# Patient Record
Sex: MALE | URBAN - METROPOLITAN AREA
[De-identification: names, ages, dates, MRNs, and addresses within clinical notes are randomized per-mention and may not be internally consistent; named-entity substitution may affect disease eponyms.]

---

## 2017-07-31 ENCOUNTER — INPATIENT (INPATIENT)
Facility: HOSPITAL | Age: 67
LOS: 6 days | Discharge: HOME | End: 2017-08-07
Attending: INTERNAL MEDICINE | Admitting: INTERNAL MEDICINE
Payer: SELF-PAY

## 2017-07-31 VITALS
OXYGEN SATURATION: 99 % | HEIGHT: 66 IN | HEART RATE: 124 BPM | TEMPERATURE: 98 F | RESPIRATION RATE: 20 BRPM | WEIGHT: 199.96 LBS | DIASTOLIC BLOOD PRESSURE: 101 MMHG | SYSTOLIC BLOOD PRESSURE: 136 MMHG

## 2017-07-31 DIAGNOSIS — N17.9 ACUTE KIDNEY FAILURE, UNSPECIFIED: ICD-10-CM

## 2017-07-31 DIAGNOSIS — E87.0 HYPEROSMOLALITY AND HYPERNATREMIA: ICD-10-CM

## 2017-07-31 DIAGNOSIS — I77.1 STRICTURE OF ARTERY: ICD-10-CM

## 2017-07-31 DIAGNOSIS — C7A.090 MALIGNANT CARCINOID TUMOR OF THE BRONCHUS AND LUNG: ICD-10-CM

## 2017-07-31 DIAGNOSIS — R03.0 ELEVATED BLOOD-PRESSURE READING, WITHOUT DIAGNOSIS OF HYPERTENSION: ICD-10-CM

## 2017-07-31 DIAGNOSIS — R91.8 OTHER NONSPECIFIC ABNORMAL FINDING OF LUNG FIELD: ICD-10-CM

## 2017-07-31 DIAGNOSIS — E83.52 HYPERCALCEMIA: ICD-10-CM

## 2017-07-31 DIAGNOSIS — R73.9 HYPERGLYCEMIA, UNSPECIFIED: ICD-10-CM

## 2017-07-31 DIAGNOSIS — Z29.9 ENCOUNTER FOR PROPHYLACTIC MEASURES, UNSPECIFIED: ICD-10-CM

## 2017-07-31 DIAGNOSIS — E11.65 TYPE 2 DIABETES MELLITUS WITH HYPERGLYCEMIA: ICD-10-CM

## 2017-07-31 DIAGNOSIS — R79.89 OTHER SPECIFIED ABNORMAL FINDINGS OF BLOOD CHEMISTRY: ICD-10-CM

## 2017-07-31 DIAGNOSIS — R74.8 ABNORMAL LEVELS OF OTHER SERUM ENZYMES: ICD-10-CM

## 2017-07-31 DIAGNOSIS — I74.3 EMBOLISM AND THROMBOSIS OF ARTERIES OF THE LOWER EXTREMITIES: ICD-10-CM

## 2017-07-31 DIAGNOSIS — I10 ESSENTIAL (PRIMARY) HYPERTENSION: ICD-10-CM

## 2017-07-31 LAB
ALBUMIN SERPL ELPH-MCNC: 4.2 G/DL — SIGNIFICANT CHANGE UP (ref 3.3–5)
ALP SERPL-CCNC: 65 U/L — SIGNIFICANT CHANGE UP (ref 40–120)
ALT FLD-CCNC: 19 U/L — SIGNIFICANT CHANGE UP (ref 12–78)
ANION GAP SERPL CALC-SCNC: 17 MMOL/L — SIGNIFICANT CHANGE UP (ref 5–17)
APPEARANCE UR: CLEAR — SIGNIFICANT CHANGE UP
AST SERPL-CCNC: 7 U/L — LOW (ref 15–37)
BACTERIA # UR AUTO: ABNORMAL
BASOPHILS # BLD AUTO: 0.1 K/UL — SIGNIFICANT CHANGE UP (ref 0–0.2)
BASOPHILS NFR BLD AUTO: 0.9 % — SIGNIFICANT CHANGE UP (ref 0–2)
BILIRUB SERPL-MCNC: 0.4 MG/DL — SIGNIFICANT CHANGE UP (ref 0.2–1.2)
BILIRUB UR-MCNC: NEGATIVE — SIGNIFICANT CHANGE UP
BUN SERPL-MCNC: 24 MG/DL — HIGH (ref 7–23)
CALCIUM SERPL-MCNC: 11.5 MG/DL — HIGH (ref 8.5–10.1)
CHLORIDE SERPL-SCNC: 109 MMOL/L — HIGH (ref 96–108)
CK MB CFR SERPL CALC: 0.8 NG/ML — SIGNIFICANT CHANGE UP (ref 0.5–3.6)
CO2 SERPL-SCNC: 23 MMOL/L — SIGNIFICANT CHANGE UP (ref 22–31)
COLOR SPEC: YELLOW — SIGNIFICANT CHANGE UP
COMMENT - URINE: SIGNIFICANT CHANGE UP
CREAT SERPL-MCNC: 1.71 MG/DL — HIGH (ref 0.5–1.3)
DIFF PNL FLD: ABNORMAL
EOSINOPHIL # BLD AUTO: 0 K/UL — SIGNIFICANT CHANGE UP (ref 0–0.5)
EOSINOPHIL NFR BLD AUTO: 0 % — SIGNIFICANT CHANGE UP (ref 0–6)
GLUCOSE SERPL-MCNC: 615 MG/DL — CRITICAL HIGH (ref 70–99)
GLUCOSE UR QL: 1000 MG/DL
HCT VFR BLD CALC: 56.3 % — HIGH (ref 39–50)
HGB BLD-MCNC: 17.7 G/DL — HIGH (ref 13–17)
KETONES UR-MCNC: ABNORMAL
LEUKOCYTE ESTERASE UR-ACNC: ABNORMAL
LIDOCAIN IGE QN: 765 U/L — HIGH (ref 73–393)
LYMPHOCYTES # BLD AUTO: 15.6 % — SIGNIFICANT CHANGE UP (ref 13–44)
LYMPHOCYTES # BLD AUTO: 2 K/UL — SIGNIFICANT CHANGE UP (ref 1–3.3)
MAGNESIUM SERPL-MCNC: 3.4 MG/DL — HIGH (ref 1.6–2.6)
MCHC RBC-ENTMCNC: 29.8 PG — SIGNIFICANT CHANGE UP (ref 27–34)
MCHC RBC-ENTMCNC: 31.4 GM/DL — LOW (ref 32–36)
MCV RBC AUTO: 94.8 FL — SIGNIFICANT CHANGE UP (ref 80–100)
MONOCYTES # BLD AUTO: 0.8 K/UL — SIGNIFICANT CHANGE UP (ref 0–0.9)
MONOCYTES NFR BLD AUTO: 6.3 % — SIGNIFICANT CHANGE UP (ref 2–14)
NEUTROPHILS # BLD AUTO: 9.9 K/UL — HIGH (ref 1.8–7.4)
NEUTROPHILS NFR BLD AUTO: 77.1 % — HIGH (ref 43–77)
NITRITE UR-MCNC: NEGATIVE — SIGNIFICANT CHANGE UP
NT-PROBNP SERPL-SCNC: 46 PG/ML — SIGNIFICANT CHANGE UP (ref 0–125)
PH UR: 5 — SIGNIFICANT CHANGE UP (ref 5–8)
PLATELET # BLD AUTO: 281 K/UL — SIGNIFICANT CHANGE UP (ref 150–400)
POTASSIUM SERPL-MCNC: 4.5 MMOL/L — SIGNIFICANT CHANGE UP (ref 3.5–5.3)
POTASSIUM SERPL-SCNC: 4.5 MMOL/L — SIGNIFICANT CHANGE UP (ref 3.5–5.3)
PROT SERPL-MCNC: 9.5 GM/DL — HIGH (ref 6–8.3)
PROT UR-MCNC: 30 MG/DL
RBC # BLD: 5.94 M/UL — HIGH (ref 4.2–5.8)
RBC # FLD: 11.9 % — SIGNIFICANT CHANGE UP (ref 11–15)
RBC CASTS # UR COMP ASSIST: SIGNIFICANT CHANGE UP /HPF (ref 0–4)
SODIUM SERPL-SCNC: 149 MMOL/L — HIGH (ref 135–145)
SP GR SPEC: 1.01 — SIGNIFICANT CHANGE UP (ref 1.01–1.02)
TROPONIN I SERPL-MCNC: <.015 NG/ML — SIGNIFICANT CHANGE UP (ref 0.01–0.04)
UROBILINOGEN FLD QL: NEGATIVE MG/DL — SIGNIFICANT CHANGE UP
WBC # BLD: 12.8 K/UL — HIGH (ref 3.8–10.5)
WBC # FLD AUTO: 12.8 K/UL — HIGH (ref 3.8–10.5)
WBC UR QL: SIGNIFICANT CHANGE UP

## 2017-07-31 PROCEDURE — 99285 EMERGENCY DEPT VISIT HI MDM: CPT | Mod: 25

## 2017-07-31 PROCEDURE — 76700 US EXAM ABDOM COMPLETE: CPT | Mod: 26

## 2017-07-31 PROCEDURE — 93010 ELECTROCARDIOGRAM REPORT: CPT

## 2017-07-31 PROCEDURE — 99223 1ST HOSP IP/OBS HIGH 75: CPT

## 2017-07-31 PROCEDURE — 99053 MED SERV 10PM-8AM 24 HR FAC: CPT

## 2017-07-31 PROCEDURE — 71010: CPT | Mod: 26

## 2017-07-31 RX ORDER — INSULIN HUMAN 100 [IU]/ML
10 INJECTION, SOLUTION SUBCUTANEOUS ONCE
Qty: 0 | Refills: 0 | Status: COMPLETED | OUTPATIENT
Start: 2017-07-31 | End: 2017-07-31

## 2017-07-31 RX ORDER — DEXTROSE 50 % IN WATER 50 %
1 SYRINGE (ML) INTRAVENOUS ONCE
Qty: 0 | Refills: 0 | Status: DISCONTINUED | OUTPATIENT
Start: 2017-07-31 | End: 2017-08-04

## 2017-07-31 RX ORDER — AMLODIPINE BESYLATE 2.5 MG/1
5 TABLET ORAL ONCE
Qty: 0 | Refills: 0 | Status: COMPLETED | OUTPATIENT
Start: 2017-07-31 | End: 2017-07-31

## 2017-07-31 RX ORDER — INSULIN GLARGINE 100 [IU]/ML
10 INJECTION, SOLUTION SUBCUTANEOUS AT BEDTIME
Qty: 0 | Refills: 0 | Status: DISCONTINUED | OUTPATIENT
Start: 2017-07-31 | End: 2017-08-03

## 2017-07-31 RX ORDER — SODIUM CHLORIDE 9 MG/ML
1000 INJECTION, SOLUTION INTRAVENOUS
Qty: 0 | Refills: 0 | Status: DISCONTINUED | OUTPATIENT
Start: 2017-07-31 | End: 2017-08-04

## 2017-07-31 RX ORDER — DEXTROSE 50 % IN WATER 50 %
25 SYRINGE (ML) INTRAVENOUS ONCE
Qty: 0 | Refills: 0 | Status: DISCONTINUED | OUTPATIENT
Start: 2017-07-31 | End: 2017-08-04

## 2017-07-31 RX ORDER — AMLODIPINE BESYLATE 2.5 MG/1
5 TABLET ORAL DAILY
Qty: 0 | Refills: 0 | Status: DISCONTINUED | OUTPATIENT
Start: 2017-07-31 | End: 2017-08-02

## 2017-07-31 RX ORDER — INSULIN LISPRO 100/ML
VIAL (ML) SUBCUTANEOUS
Qty: 0 | Refills: 0 | Status: DISCONTINUED | OUTPATIENT
Start: 2017-07-31 | End: 2017-08-04

## 2017-07-31 RX ORDER — SODIUM CHLORIDE 9 MG/ML
1000 INJECTION INTRAMUSCULAR; INTRAVENOUS; SUBCUTANEOUS ONCE
Qty: 0 | Refills: 0 | Status: COMPLETED | OUTPATIENT
Start: 2017-07-31 | End: 2017-08-01

## 2017-07-31 RX ORDER — SODIUM CHLORIDE 9 MG/ML
1000 INJECTION INTRAMUSCULAR; INTRAVENOUS; SUBCUTANEOUS ONCE
Qty: 0 | Refills: 0 | Status: COMPLETED | OUTPATIENT
Start: 2017-07-31 | End: 2017-07-31

## 2017-07-31 RX ORDER — SODIUM CHLORIDE 9 MG/ML
1000 INJECTION, SOLUTION INTRAVENOUS
Qty: 0 | Refills: 0 | Status: DISCONTINUED | OUTPATIENT
Start: 2017-07-31 | End: 2017-08-01

## 2017-07-31 RX ORDER — DEXTROSE 50 % IN WATER 50 %
12.5 SYRINGE (ML) INTRAVENOUS ONCE
Qty: 0 | Refills: 0 | Status: DISCONTINUED | OUTPATIENT
Start: 2017-07-31 | End: 2017-08-04

## 2017-07-31 RX ORDER — INSULIN LISPRO 100/ML
VIAL (ML) SUBCUTANEOUS AT BEDTIME
Qty: 0 | Refills: 0 | Status: DISCONTINUED | OUTPATIENT
Start: 2017-07-31 | End: 2017-08-04

## 2017-07-31 RX ORDER — HEPARIN SODIUM 5000 [USP'U]/ML
5000 INJECTION INTRAVENOUS; SUBCUTANEOUS EVERY 12 HOURS
Qty: 0 | Refills: 0 | Status: DISCONTINUED | OUTPATIENT
Start: 2017-07-31 | End: 2017-08-03

## 2017-07-31 RX ORDER — INSULIN HUMAN 100 [IU]/ML
5 INJECTION, SOLUTION SUBCUTANEOUS
Qty: 100 | Refills: 0 | Status: DISCONTINUED | OUTPATIENT
Start: 2017-07-31 | End: 2017-07-31

## 2017-07-31 RX ORDER — GLUCAGON INJECTION, SOLUTION 0.5 MG/.1ML
1 INJECTION, SOLUTION SUBCUTANEOUS ONCE
Qty: 0 | Refills: 0 | Status: DISCONTINUED | OUTPATIENT
Start: 2017-07-31 | End: 2017-08-04

## 2017-07-31 RX ADMIN — Medication 4: at 23:27

## 2017-07-31 RX ADMIN — SODIUM CHLORIDE 150 MILLILITER(S): 9 INJECTION, SOLUTION INTRAVENOUS at 17:54

## 2017-07-31 RX ADMIN — SODIUM CHLORIDE 1000 MILLILITER(S): 9 INJECTION INTRAMUSCULAR; INTRAVENOUS; SUBCUTANEOUS at 10:47

## 2017-07-31 RX ADMIN — HEPARIN SODIUM 5000 UNIT(S): 5000 INJECTION INTRAVENOUS; SUBCUTANEOUS at 18:10

## 2017-07-31 RX ADMIN — SODIUM CHLORIDE 1000 MILLILITER(S): 9 INJECTION INTRAMUSCULAR; INTRAVENOUS; SUBCUTANEOUS at 08:25

## 2017-07-31 RX ADMIN — AMLODIPINE BESYLATE 5 MILLIGRAM(S): 2.5 TABLET ORAL at 23:27

## 2017-07-31 RX ADMIN — INSULIN HUMAN 10 UNIT(S): 100 INJECTION, SOLUTION SUBCUTANEOUS at 10:22

## 2017-07-31 RX ADMIN — Medication 8: at 17:52

## 2017-07-31 RX ADMIN — AMLODIPINE BESYLATE 5 MILLIGRAM(S): 2.5 TABLET ORAL at 18:10

## 2017-07-31 NOTE — ED PROVIDER NOTE - OBJECTIVE STATEMENT
Pt is a 68 yo gentleman with no significant past medical history who presents to the ED with diminished appetite since last week. Denies any abdominal pain, no n/v/d or fever. No headache, no dysuria. Came from [place] 1 month ago, says has had problem before in the past, and was given medication to improve his appetite. Says he has weight loss, but no night sweats. Does not know how much weight he has loss. He is able to eat, just doesn't want to eat as much. Has been drinking water and juice. No chest pain, no sob.

## 2017-07-31 NOTE — ED ADULT NURSE NOTE - OBJECTIVE STATEMENT
pt c/o poor appetite since x 2 weeks pt c/o poor appetite since x 2 weeks. pt states does not have pharmacy

## 2017-07-31 NOTE — H&P ADULT - PROBLEM SELECTOR PLAN 2
US done kidney no hydronephrosis   maybe be due to pre renal vs DM  continue with IVF if no improvement in 24 hours then consider renal consult and w/u

## 2017-07-31 NOTE — ED PROVIDER NOTE - MEDICAL DECISION MAKING DETAILS
Ddx: Dehydration / No focal causes or symptoms of malignancy/ diminished appetite from new cuisine, age  Plan: Labs, fluids, cxr, reassess

## 2017-07-31 NOTE — ED ADULT TRIAGE NOTE - CHIEF COMPLAINT QUOTE
"no appetite" reports having poor appetite for 2 weeks ago. Denies nausea, vomiting, diarrhea, abdominal discomfort. Reports have taken "Omafed" in the past for poor appetite with good improvement of appetite. Denies past medical history.

## 2017-07-31 NOTE — H&P ADULT - NSHPLABSRESULTS_GEN_ALL_CORE
17.7   12.8  )-----------( 281      ( 31 Jul 2017 08:25 )             56.3   07-31    149<H>  |  109<H>  |  24<H>  ----------------------------<  615<HH>  4.5   |  23  |  1.71<H>    Ca    11.5<H>      31 Jul 2017 08:25  Mg     3.4     07-31    TPro  9.5<H>  /  Alb  4.2  /  TBili  0.4  /  DBili  x   /  AST  7<L>  /  ALT  19  /  AlkPhos  65  07-31      < from: US Abdomen Complete (07.31.17 @ 11:40) >    IMPRESSION:    Hepatic fibrofatty changes.  No cholelithiasis or biliary ductal dilatation.    < end of copied text >< from: Xray Chest 1 View AP/PA. (07.31.17 @ 07:49) >    EXAM:  CHEST SINGLE VIEW                            PROCEDURE DATE:  07/31/2017          INTERPRETATION:  cough    A frontal chest film demonstrates a rounded lesion that suggesting a mass   in the left the periaortic region likely in the lower lobe. This measures   4.7 cm.    The heart size and vascular markings are within normal limits for   technique.      No mediastinal or hilar adenopathy is suggested. .     The osseous structures appear intact intact.     IMPRESSION:  Left lung mass. CT imaging of the chest recommended.

## 2017-07-31 NOTE — H&P ADULT - PROBLEM SELECTOR PLAN 6
could be due to dehydration or related to lung mass.    may need further w/u with PTH related peptide  especially if lung mass confirmed . will continue with IVF monitor calcium   will check ionized calcium

## 2017-07-31 NOTE — ED PROVIDER NOTE - PROGRESS NOTE DETAILS
Pt comfortable, without complaint. FS dropped to 475 after fluids, then 415 after insulin. Admitted for further management.

## 2017-07-31 NOTE — H&P ADULT - NSHPREVIEWOFSYSTEMS_GEN_ALL_CORE
CONSTITUTIONAL: No fever, weight loss, or fatigue  EYES: No eye pain, visual disturbances, or discharge  ENMT:  No difficulty hearing, tinnitus, vertigo; No sinus or throat pain  NECK: No pain or stiffness  BREASTS: No pain, masses, or nipple discharge  RESPIRATORY: No cough, wheezing, chills or hemoptysis; No shortness of breath  CARDIOVASCULAR: No chest pain, palpitations, dizziness, or leg swelling +weight loss, decreased appetite, frequent urination, blurred vission  GASTROINTESTINAL: No abdominal or epigastric pain. No nausea, vomiting, or hematemesis; No diarrhea or constipation. No melena or hematochezia.  GENITOURINARY: No dysuria,  increased frequency, no  hematuria, or incontinence  NEUROLOGICAL: No headaches, memory loss, loss of strength, numbness, or tremors  SKIN: No itching, burning, rashes, or lesions   LYMPH NODES: No enlarged glands  ENDOCRINE: No heat or cold intolerance; No hair loss  MUSCULOSKELETAL: No joint pain or swelling; No muscle, back, or extremity pain  PSYCHIATRIC: No depression, anxiety, mood swings, or difficulty sleeping  HEME/LYMPH: No easy bruising, or bleeding gums  ALLERGY AND IMMUNOLOGIC: No hives or eczema

## 2017-07-31 NOTE — H&P ADULT - PROBLEM SELECTOR PLAN 1
new onset DM. not in DKA as AG at 17 per [facility] labs.    s/p 10 units ed BS on admission 615 now 357.     check HgBA1C   Lipid panel  RISS then can convert to basal and short acting after 24 hours of FS    Dm education   Nutrition education

## 2017-07-31 NOTE — H&P ADULT - HISTORY OF PRESENT ILLNESS
· HPI Objective Statement: Pt is a 66 yo gentleman with no significant past medical history who presents to the ED with diminished appetite since last week upon inital questioning. However he states he saw a doctor in [place] approx 3 months ago at which time was instructed to follow up. prior to that doctor visit in [place] patient has not seen a physician in a number of years.  He was visiting his elderly mom who had not been feeling well and attributed  his decreased appetite to this event. Nevertheless he has since returned to [facility] one month ago and appetite continues to deteriorate. He only drinks water and juices and expressed frequent urination. Also c/o weight loss but unable to quantify.   Denies any abdominal pain, no n/v/d or fever. No headache, no dysuria. He is able to eat, just doesn't want to eat as much. He denies. No chest pain, no sob.   In ED BS was elevated to 615.

## 2017-07-31 NOTE — H&P ADULT - PROBLEM SELECTOR PLAN 5
HTN but  no official diagnosis   will start Norvasc . can change to ACEI if creatinine improves or reaches a steady state.

## 2017-08-01 LAB
AMYLASE P1 CFR SERPL: 89 U/L — SIGNIFICANT CHANGE UP (ref 25–115)
ANION GAP SERPL CALC-SCNC: 12 MMOL/L — SIGNIFICANT CHANGE UP (ref 5–17)
BUN SERPL-MCNC: 14 MG/DL — SIGNIFICANT CHANGE UP (ref 7–23)
CA-I BLD-SCNC: 1.32 MMOL/L — HIGH (ref 1.12–1.3)
CALCIUM SERPL-MCNC: 9.7 MG/DL — SIGNIFICANT CHANGE UP (ref 8.5–10.1)
CHLORIDE SERPL-SCNC: 112 MMOL/L — HIGH (ref 96–108)
CHOLEST SERPL-MCNC: 244 MG/DL — HIGH (ref 10–199)
CO2 SERPL-SCNC: 26 MMOL/L — SIGNIFICANT CHANGE UP (ref 22–31)
CREAT SERPL-MCNC: 1.02 MG/DL — SIGNIFICANT CHANGE UP (ref 0.5–1.3)
CULTURE RESULTS: SIGNIFICANT CHANGE UP
GLUCOSE SERPL-MCNC: 294 MG/DL — HIGH (ref 70–99)
HBA1C BLD-MCNC: 13.4 % — HIGH (ref 4–5.6)
HCT VFR BLD CALC: 46.7 % — SIGNIFICANT CHANGE UP (ref 39–50)
HDLC SERPL-MCNC: 35 MG/DL — LOW (ref 40–125)
HGB BLD-MCNC: 15.4 G/DL — SIGNIFICANT CHANGE UP (ref 13–17)
LIDOCAIN IGE QN: 457 U/L — HIGH (ref 73–393)
LIPID PNL WITH DIRECT LDL SERPL: 177 MG/DL — HIGH
MAGNESIUM SERPL-MCNC: 2.3 MG/DL — SIGNIFICANT CHANGE UP (ref 1.6–2.6)
MCHC RBC-ENTMCNC: 30.9 PG — SIGNIFICANT CHANGE UP (ref 27–34)
MCHC RBC-ENTMCNC: 33 GM/DL — SIGNIFICANT CHANGE UP (ref 32–36)
MCV RBC AUTO: 93.7 FL — SIGNIFICANT CHANGE UP (ref 80–100)
PHOSPHATE SERPL-MCNC: 2.3 MG/DL — LOW (ref 2.5–4.5)
PLATELET # BLD AUTO: 227 K/UL — SIGNIFICANT CHANGE UP (ref 150–400)
POTASSIUM SERPL-MCNC: 4.2 MMOL/L — SIGNIFICANT CHANGE UP (ref 3.5–5.3)
POTASSIUM SERPL-SCNC: 4.2 MMOL/L — SIGNIFICANT CHANGE UP (ref 3.5–5.3)
RBC # BLD: 4.99 M/UL — SIGNIFICANT CHANGE UP (ref 4.2–5.8)
RBC # FLD: 11.5 % — SIGNIFICANT CHANGE UP (ref 11–15)
SODIUM SERPL-SCNC: 150 MMOL/L — HIGH (ref 135–145)
SPECIMEN SOURCE: SIGNIFICANT CHANGE UP
TOTAL CHOLESTEROL/HDL RATIO MEASUREMENT: 7 RATIO — SIGNIFICANT CHANGE UP (ref 3.4–9.6)
TRIGL SERPL-MCNC: 160 MG/DL — HIGH (ref 10–149)
WBC # BLD: 11.2 K/UL — HIGH (ref 3.8–10.5)
WBC # FLD AUTO: 11.2 K/UL — HIGH (ref 3.8–10.5)

## 2017-08-01 PROCEDURE — 71250 CT THORAX DX C-: CPT | Mod: 26

## 2017-08-01 PROCEDURE — 93925 LOWER EXTREMITY STUDY: CPT | Mod: 26

## 2017-08-01 PROCEDURE — 99233 SBSQ HOSP IP/OBS HIGH 50: CPT

## 2017-08-01 RX ORDER — ASPIRIN/CALCIUM CARB/MAGNESIUM 324 MG
81 TABLET ORAL DAILY
Qty: 0 | Refills: 0 | Status: DISCONTINUED | OUTPATIENT
Start: 2017-08-01 | End: 2017-08-03

## 2017-08-01 RX ADMIN — SODIUM CHLORIDE 1000 MILLILITER(S): 9 INJECTION INTRAMUSCULAR; INTRAVENOUS; SUBCUTANEOUS at 00:15

## 2017-08-01 RX ADMIN — Medication 6: at 08:25

## 2017-08-01 RX ADMIN — SODIUM CHLORIDE 150 MILLILITER(S): 9 INJECTION, SOLUTION INTRAVENOUS at 12:14

## 2017-08-01 RX ADMIN — INSULIN GLARGINE 10 UNIT(S): 100 INJECTION, SOLUTION SUBCUTANEOUS at 22:32

## 2017-08-01 RX ADMIN — Medication 8: at 12:14

## 2017-08-01 RX ADMIN — INSULIN GLARGINE 10 UNIT(S): 100 INJECTION, SOLUTION SUBCUTANEOUS at 00:15

## 2017-08-01 RX ADMIN — Medication 2: at 22:31

## 2017-08-01 RX ADMIN — HEPARIN SODIUM 5000 UNIT(S): 5000 INJECTION INTRAVENOUS; SUBCUTANEOUS at 17:33

## 2017-08-01 RX ADMIN — SODIUM CHLORIDE 150 MILLILITER(S): 9 INJECTION, SOLUTION INTRAVENOUS at 03:38

## 2017-08-01 RX ADMIN — Medication 6: at 17:33

## 2017-08-01 RX ADMIN — AMLODIPINE BESYLATE 5 MILLIGRAM(S): 2.5 TABLET ORAL at 05:43

## 2017-08-01 RX ADMIN — HEPARIN SODIUM 5000 UNIT(S): 5000 INJECTION INTRAVENOUS; SUBCUTANEOUS at 05:38

## 2017-08-01 NOTE — PROGRESS NOTE ADULT - PROBLEM SELECTOR PLAN 1
new onset DM. not in DKA as AG at 17 per [facility] labs.   f/s better, awaiting a1c, c/w insulin   Lipid panel   Dm education   Nutrition education

## 2017-08-01 NOTE — PATIENT PROFILE ADULT. - VISION (WITH CORRECTIVE LENSES IF THE PATIENT USUALLY WEARS THEM):
use reading glasses/Normal vision: sees adequately in most situations; can see medication labels, newsprint

## 2017-08-01 NOTE — PROGRESS NOTE ADULT - PROBLEM SELECTOR PLAN 3
Left upper lobe/suprahilar central lung mass (3.6 x 3.4 x 3.7 cm) with   nodular endobronchial component (1 cm) noted within the left upper lobe   bronchus.Additional nodules within the right lower lobe measuring 2 mm and 5 mm.    denies cough or smoking h/o or night sweat. admit to weight loss and decreased appetite    Pulmonary eval

## 2017-08-01 NOTE — PROGRESS NOTE ADULT - SUBJECTIVE AND OBJECTIVE BOX
Patient is a 67y old  Male who presents with a chief complaint of poor apptiite (01 Aug 2017 01:15)      INTERVAL HPI/OVERNIGHT EVENTS: feels better today    MEDICATIONS  (STANDING):  insulin lispro (HumaLOG) corrective regimen sliding scale   SubCutaneous three times a day before meals  insulin lispro (HumaLOG) corrective regimen sliding scale   SubCutaneous at bedtime  dextrose 5%. 1000 milliLiter(s) (50 mL/Hr) IV Continuous <Continuous>  dextrose 50% Injectable 12.5 Gram(s) IV Push once  dextrose 50% Injectable 25 Gram(s) IV Push once  dextrose 50% Injectable 25 Gram(s) IV Push once  heparin  Injectable 5000 Unit(s) SubCutaneous every 12 hours  sodium chloride 0.45%. 1000 milliLiter(s) (150 mL/Hr) IV Continuous <Continuous>  amLODIPine   Tablet 5 milliGRAM(s) Oral daily  insulin glargine Injectable (LANTUS) 10 Unit(s) SubCutaneous at bedtime    MEDICATIONS  (PRN):  dextrose Gel 1 Dose(s) Oral once PRN Blood Glucose LESS THAN 70 milliGRAM(s)/deciliter  glucagon  Injectable 1 milliGRAM(s) IntraMuscular once PRN Glucose LESS THAN 70 milligrams/deciliter      Allergies    No Known Allergies    Intolerances        REVIEW OF SYSTEMS:  CONSTITUTIONAL: No fever, weight loss, or fatigue  EYES: No eye pain, visual disturbances, or discharge  ENMT:  No difficulty hearing, tinnitus, vertigo; No sinus or throat pain  NECK: No pain or stiffness  BREASTS: No pain, masses, or nipple discharge  RESPIRATORY: No cough, wheezing, chills or hemoptysis; No shortness of breath  CARDIOVASCULAR: No chest pain, palpitations, dizziness, or leg swelling +weight loss, decreased appetite, frequent urination, blurred vission  GASTROINTESTINAL: No abdominal or epigastric pain. No nausea, vomiting, or hematemesis; No diarrhea or constipation. No melena or hematochezia.  GENITOURINARY: No dysuria,  increased frequency, no  hematuria, or incontinence  NEUROLOGICAL: No headaches, memory loss, loss of strength, numbness, or tremors  SKIN: No itching, burning, rashes, or lesions   LYMPH NODES: No enlarged glands  ENDOCRINE: No heat or cold intolerance; No hair loss  MUSCULOSKELETAL: No joint pain or swelling; No muscle, back, or extremity pain  PSYCHIATRIC: No depression, anxiety, mood swings, or difficulty sleeping  HEME/LYMPH: No easy bruising, or bleeding gums  ALLERGY AND IMMUNOLOGIC: No hives or eczema  Vital Signs Last 24 Hrs  T(C): 36.6 (01 Aug 2017 11:31), Max: 37 (01 Aug 2017 01:08)  T(F): 97.8 (01 Aug 2017 11:31), Max: 98.6 (01 Aug 2017 01:08)  HR: 93 (01 Aug 2017 11:) (82 - 104)  BP: 143/87 (01 Aug 2017 11:31) (143/87 - 170/104)  BP(mean): --  RR: 16 (01 Aug 2017 11:31) (15 - 18)  SpO2: 95% (01 Aug 2017 11:) (95% - 99%)    PHYSICAL EXAM:  GENERAL: NAD, well-groomed, well-developed  HEAD:  Atraumatic, Normocephalic  EYES: EOMI, PERRLA, conjunctiva and sclera clear  ENMT: No tonsillar erythema, exudates, or enlargement; Moist mucous membranes, Good dentition, No lesions  NECK: Supple, No JVD, Normal thyroid  NERVOUS SYSTEM:  Alert & Oriented X3, Good concentration; Motor Strength 5/5 B/L upper and lower extremities; DTRs 2+ intact and symmetric  CHEST/LUNG: Clear to percussion bilaterally; No rales, rhonchi, wheezing, or rubs  HEART: Regular rate and rhythm; No murmurs, rubs, or gallops  ABDOMEN: Soft, Nontender, Nondistended; Bowel sounds present  EXTREMITIES:  2+ Peripheral Pulses, No clubbing, cyanosis, or edema  LYMPH: No lymphadenopathy noted  SKIN: No rashes or lesions    LABS:                        15.4   11.2  )-----------( 227      ( 01 Aug 2017 07:18 )             46.7     08-01    150<H>  |  112<H>  |  14  ----------------------------<  294<H>  4.2   |  26  |  1.02    Ca    9.7      01 Aug 2017 07:18  Phos  2.3     08-  Mg     2.3     08-    TPro  9.5<H>  /  Alb  4.2  /  TBili  0.4  /  DBili  x   /  AST  7<L>  /  ALT  19  /  AlkPhos  65  07-31      Urinalysis Basic - ( 2017 10:30 )    Color: Yellow / Appearance: Clear / S.015 / pH: x  Gluc: x / Ketone: Large  / Bili: Negative / Urobili: Negative mg/dL   Blood: x / Protein: 30 mg/dL / Nitrite: Negative   Leuk Esterase: Trace / RBC: 0-2 /HPF / WBC 0-2   Sq Epi: x / Non Sq Epi: x / Bacteria: x      CAPILLARY BLOOD GLUCOSE  294 (01 Aug 2017 07:57)  347 (2017 22:45)  307 (2017 17:48)  350 (2017 14:25)          RADIOLOGY & ADDITIONAL TESTS:    Imaging Personally Reviewed:  [ ] YES  [ ] NO    Consultant(s) Notes Reviewed:  [ ] YES  [ ] NO    Care Discussed with Consultants/Other Providers [ ] YES  [ ] NO

## 2017-08-02 LAB
ANION GAP SERPL CALC-SCNC: 11 MMOL/L — SIGNIFICANT CHANGE UP (ref 5–17)
BASOPHILS # BLD AUTO: 0.1 K/UL — SIGNIFICANT CHANGE UP (ref 0–0.2)
BASOPHILS NFR BLD AUTO: 1.1 % — SIGNIFICANT CHANGE UP (ref 0–2)
BUN SERPL-MCNC: 12 MG/DL — SIGNIFICANT CHANGE UP (ref 7–23)
CALCIUM SERPL-MCNC: 9.2 MG/DL — SIGNIFICANT CHANGE UP (ref 8.5–10.1)
CHLORIDE SERPL-SCNC: 103 MMOL/L — SIGNIFICANT CHANGE UP (ref 96–108)
CO2 SERPL-SCNC: 27 MMOL/L — SIGNIFICANT CHANGE UP (ref 22–31)
CREAT SERPL-MCNC: 0.85 MG/DL — SIGNIFICANT CHANGE UP (ref 0.5–1.3)
EOSINOPHIL # BLD AUTO: 0.3 K/UL — SIGNIFICANT CHANGE UP (ref 0–0.5)
EOSINOPHIL NFR BLD AUTO: 2.8 % — SIGNIFICANT CHANGE UP (ref 0–6)
GLUCOSE SERPL-MCNC: 290 MG/DL — HIGH (ref 70–99)
HCT VFR BLD CALC: 46.2 % — SIGNIFICANT CHANGE UP (ref 39–50)
HGB BLD-MCNC: 15.4 G/DL — SIGNIFICANT CHANGE UP (ref 13–17)
LIDOCAIN IGE QN: 380 U/L — SIGNIFICANT CHANGE UP (ref 73–393)
LYMPHOCYTES # BLD AUTO: 3.5 K/UL — HIGH (ref 1–3.3)
LYMPHOCYTES # BLD AUTO: 39 % — SIGNIFICANT CHANGE UP (ref 13–44)
MCHC RBC-ENTMCNC: 31 PG — SIGNIFICANT CHANGE UP (ref 27–34)
MCHC RBC-ENTMCNC: 33.4 GM/DL — SIGNIFICANT CHANGE UP (ref 32–36)
MCV RBC AUTO: 92.7 FL — SIGNIFICANT CHANGE UP (ref 80–100)
MONOCYTES # BLD AUTO: 0.6 K/UL — SIGNIFICANT CHANGE UP (ref 0–0.9)
MONOCYTES NFR BLD AUTO: 7.2 % — SIGNIFICANT CHANGE UP (ref 2–14)
NEUTROPHILS # BLD AUTO: 4.5 K/UL — SIGNIFICANT CHANGE UP (ref 1.8–7.4)
NEUTROPHILS NFR BLD AUTO: 49.9 % — SIGNIFICANT CHANGE UP (ref 43–77)
PHOSPHATE SERPL-MCNC: 3.1 MG/DL — SIGNIFICANT CHANGE UP (ref 2.5–4.5)
PLATELET # BLD AUTO: 201 K/UL — SIGNIFICANT CHANGE UP (ref 150–400)
POTASSIUM SERPL-MCNC: 3.6 MMOL/L — SIGNIFICANT CHANGE UP (ref 3.5–5.3)
POTASSIUM SERPL-SCNC: 3.6 MMOL/L — SIGNIFICANT CHANGE UP (ref 3.5–5.3)
RBC # BLD: 4.98 M/UL — SIGNIFICANT CHANGE UP (ref 4.2–5.8)
RBC # FLD: 11.2 % — SIGNIFICANT CHANGE UP (ref 11–15)
SODIUM SERPL-SCNC: 141 MMOL/L — SIGNIFICANT CHANGE UP (ref 135–145)
WBC # BLD: 8.9 K/UL — SIGNIFICANT CHANGE UP (ref 3.8–10.5)
WBC # FLD AUTO: 8.9 K/UL — SIGNIFICANT CHANGE UP (ref 3.8–10.5)

## 2017-08-02 PROCEDURE — 99233 SBSQ HOSP IP/OBS HIGH 50: CPT

## 2017-08-02 RX ORDER — INSULIN LISPRO 100/ML
6 VIAL (ML) SUBCUTANEOUS
Qty: 0 | Refills: 0 | Status: DISCONTINUED | OUTPATIENT
Start: 2017-08-02 | End: 2017-08-03

## 2017-08-02 RX ORDER — LISINOPRIL 2.5 MG/1
2.5 TABLET ORAL DAILY
Qty: 0 | Refills: 0 | Status: DISCONTINUED | OUTPATIENT
Start: 2017-08-02 | End: 2017-08-04

## 2017-08-02 RX ORDER — ATORVASTATIN CALCIUM 80 MG/1
20 TABLET, FILM COATED ORAL AT BEDTIME
Qty: 0 | Refills: 0 | Status: DISCONTINUED | OUTPATIENT
Start: 2017-08-02 | End: 2017-08-04

## 2017-08-02 RX ADMIN — HEPARIN SODIUM 5000 UNIT(S): 5000 INJECTION INTRAVENOUS; SUBCUTANEOUS at 06:22

## 2017-08-02 RX ADMIN — Medication 81 MILLIGRAM(S): at 11:14

## 2017-08-02 RX ADMIN — Medication 4: at 16:54

## 2017-08-02 RX ADMIN — Medication 6: at 08:08

## 2017-08-02 RX ADMIN — Medication 6 UNIT(S): at 16:54

## 2017-08-02 RX ADMIN — ATORVASTATIN CALCIUM 20 MILLIGRAM(S): 80 TABLET, FILM COATED ORAL at 21:57

## 2017-08-02 RX ADMIN — Medication 6 UNIT(S): at 12:12

## 2017-08-02 RX ADMIN — HEPARIN SODIUM 5000 UNIT(S): 5000 INJECTION INTRAVENOUS; SUBCUTANEOUS at 17:00

## 2017-08-02 RX ADMIN — INSULIN GLARGINE 10 UNIT(S): 100 INJECTION, SOLUTION SUBCUTANEOUS at 21:58

## 2017-08-02 RX ADMIN — Medication 6: at 11:13

## 2017-08-02 RX ADMIN — AMLODIPINE BESYLATE 5 MILLIGRAM(S): 2.5 TABLET ORAL at 06:22

## 2017-08-02 RX ADMIN — Medication 2: at 21:57

## 2017-08-02 RX ADMIN — LISINOPRIL 2.5 MILLIGRAM(S): 2.5 TABLET ORAL at 12:12

## 2017-08-02 NOTE — DIETITIAN INITIAL EVALUATION ADULT. - OTHER INFO
Pt seen for RN consult 8/2 for [id number]=68.% & new DM with Glucose=615(7/31)  Pt lives with wife in [place]. Pt new DM presents with polyuria, polydipsia, blurry vision, tingling.  Pt reports decreased po intake x 1 month with sign wt loss. No BM x 2 days. Pt consumed 50% dinner last night with improved appetite.

## 2017-08-02 NOTE — PROGRESS NOTE ADULT - SUBJECTIVE AND OBJECTIVE BOX
Patient is a 67y old  Male who presents with a chief complaint of poor apptiite (01 Aug 2017 01:15)      INTERVAL HPI/OVERNIGHT EVENTS: slept well overnight no issues    MEDICATIONS  (STANDING):  insulin lispro (HumaLOG) corrective regimen sliding scale   SubCutaneous three times a day before meals  insulin lispro (HumaLOG) corrective regimen sliding scale   SubCutaneous at bedtime  dextrose 5%. 1000 milliLiter(s) (50 mL/Hr) IV Continuous <Continuous>  dextrose 50% Injectable 12.5 Gram(s) IV Push once  dextrose 50% Injectable 25 Gram(s) IV Push once  dextrose 50% Injectable 25 Gram(s) IV Push once  heparin  Injectable 5000 Unit(s) SubCutaneous every 12 hours  amLODIPine   Tablet 5 milliGRAM(s) Oral daily  insulin glargine Injectable (LANTUS) 10 Unit(s) SubCutaneous at bedtime  aspirin  chewable 81 milliGRAM(s) Oral daily  insulin lispro Injectable (HumaLOG) 6 Unit(s) SubCutaneous three times a day with meals    MEDICATIONS  (PRN):  dextrose Gel 1 Dose(s) Oral once PRN Blood Glucose LESS THAN 70 milliGRAM(s)/deciliter  glucagon  Injectable 1 milliGRAM(s) IntraMuscular once PRN Glucose LESS THAN 70 milligrams/deciliter      Allergies    No Known Allergies    Intolerances        REVIEW OF SYSTEMS:  CONSTITUTIONAL: No fever, weight loss, or fatigue  EYES: No eye pain, visual disturbances, or discharge  ENMT:  No difficulty hearing, tinnitus, vertigo; No sinus or throat pain  NECK: No pain or stiffness  BREASTS: No pain, masses, or nipple discharge  RESPIRATORY: No cough, wheezing, chills or hemoptysis; No shortness of breath  CARDIOVASCULAR: No chest pain, palpitations, dizziness, or leg swelling  GASTROINTESTINAL: No abdominal or epigastric pain. No nausea, vomiting, or hematemesis; No diarrhea or constipation. No melena or hematochezia.  GENITOURINARY: No dysuria, frequency, hematuria, or incontinence  NEUROLOGICAL: No headaches, memory loss, loss of strength, numbness, or tremors  SKIN: No itching, burning, rashes, or lesions   LYMPH NODES: No enlarged glands  ENDOCRINE: No heat or cold intolerance; No hair loss  MUSCULOSKELETAL: No joint pain or swelling; No muscle, back, or extremity pain  PSYCHIATRIC: No depression, anxiety, mood swings, or difficulty sleeping  HEME/LYMPH: No easy bruising, or bleeding gums  ALLERGY AND IMMUNOLOGIC: No hives or eczema    Vital Signs Last 24 Hrs  T(C): 36.8 (02 Aug 2017 10:29), Max: 37.1 (01 Aug 2017 16:23)  T(F): 98.2 (02 Aug 2017 10:29), Max: 98.8 (01 Aug 2017 16:23)  HR: 102 (02 Aug 2017 10:29) (91 - 102)  BP: 127/75 (02 Aug 2017 10:29) (127/75 - 152/87)  BP(mean): --  RR: 16 (02 Aug 2017 10:29) (16 - 16)  SpO2: 95% (02 Aug 2017 10:29) (95% - 98%)    PHYSICAL EXAM:  GENERAL: NAD, well-groomed, well-developed  HEAD:  Atraumatic, Normocephalic  EYES: EOMI, PERRLA, conjunctiva and sclera clear  ENMT: No tonsillar erythema, exudates, or enlargement; Moist mucous membranes, Good dentition, No lesions  NECK: Supple, No JVD, Normal thyroid  NERVOUS SYSTEM:  Alert & Oriented X3, Good concentration; Motor Strength 5/5 B/L upper and lower extremities; DTRs 2+ intact and symmetric  CHEST/LUNG: Clear to percussion bilaterally; No rales, rhonchi, wheezing, or rubs  HEART: Regular rate and rhythm; No murmurs, rubs, or gallops  ABDOMEN: Soft, Nontender, Nondistended; Bowel sounds present  EXTREMITIES:  1+ Peripheral Pulses, No clubbing, cyanosis, or edema  LYMPH: No lymphadenopathy noted  SKIN: No rashes or lesions    LABS:                        15.4   8.9   )-----------( 201      ( 02 Aug 2017 06:41 )             46.2     08-02    141  |  103  |  12  ----------------------------<  290<H>  3.6   |  27  |  0.85    Ca    9.2      02 Aug 2017 06:41  Phos  3.1     08-02  Mg     2.3     08-01          CAPILLARY BLOOD GLUCOSE  290 (02 Aug 2017 08:08)  263 (01 Aug 2017 22:30)  266 (01 Aug 2017 16:56)          RADIOLOGY & ADDITIONAL TESTS:    Imaging Personally Reviewed:  [ ] YES  [ ] NO    Consultant(s) Notes Reviewed:  [ ] YES  [ ] NO    Care Discussed with Consultants/Other Providers [ ] YES  [ ] NO

## 2017-08-02 NOTE — DIETITIAN INITIAL EVALUATION ADULT. - PHYSICAL APPEARANCE
overweight/BMI=29.9; no edema; Nutrition focused physical exam conducted ; found signs of malnutrition [ ]absent [x ]present.  Subcutaneous fat loss: [WNL ] Orbital fat pads region, [WNL ]Buccal fat region, [WNL ]Triceps region,  [WNL ]Ribs region.  Muscle wasting: [Moderate ]Temples region, [Moderate ]Clavicle region, [Moderate ]Shoulder region, [unable ]Scapula region, [WNL ]Interosseous region,  [WNL ]thigh region, [WNL ]Calf region

## 2017-08-02 NOTE — CONSULT NOTE ADULT - PROBLEM SELECTOR RECOMMENDATION 2
Hypercalcemia resolved   Follow up patient can be discharged on the same regimen;   no treatment   Thank You for the courtesy of this consultation !!!

## 2017-08-02 NOTE — DIETITIAN INITIAL EVALUATION ADULT. - PERTINENT LABORATORY DATA
08-02 Na 141 mmol/L Glu 290 mg/dL<H> K+ 3.6 mmol/L Cr  0.85 mg/dL BUN 12 mg/dL Phos 3.1 mg/dL Alb n/a   PAB n/a   Hgb 15.4 g/dL Hct 46.2 %, Alb=4.2, Ketones=large, 08-01 Chol 244<H> <H> HDL 35<L> Trig 160<H>

## 2017-08-02 NOTE — DIETITIAN INITIAL EVALUATION ADULT. - ETIOLOGY
Increased nutrient needs & inadequate energy & protein intake related to new dx DM type 2 with hyperglycemia; glucose=615 & [id number]=55.4% with large ketones; s/s polydipsia & polyuria

## 2017-08-02 NOTE — DIETITIAN INITIAL EVALUATION ADULT. - NUTRITIONGOAL OUTCOME1
Pt to consume >75% meals/supplements & maintain stable wt +/-2# Pt to consume >75% meals/supplements & maintain stable wt +/-2# & improve glycemic control Pt to consume >75% meals/supplements & maintain stable wt +/-2# & improve glycemic control HgbA1C<7%

## 2017-08-02 NOTE — CONSULT NOTE ADULT - PROBLEM SELECTOR RECOMMENDATION 9
Continue with the same regimen while inpatient   ? Metformin   Patient should have strict diet control and do exercise as tolerated upon discharge

## 2017-08-02 NOTE — PROGRESS NOTE ADULT - PROBLEM SELECTOR PLAN 1
new onset DM. not in DKA as AG at 17 per [facility] labs.   a1c elevated, c/w insulin   Added meal time insuline monitoring fs, endo consult placed   Dm education   Nutrition education new onset DM. not in DKA as AG at 17 per [facility] labs.   a1c elevated, c/w insulin   Added meal time insuline monitoring fs, endo consult placed   Dm education   Nutrition education    Added lipitor for HLD, monitoring for side effects

## 2017-08-02 NOTE — CONSULT NOTE ADULT - SUBJECTIVE AND OBJECTIVE BOX
Patient is a 67y old  Male who presents with a chief complaint of poor apptiite (01 Aug 2017 01:15)      Reason For Consult:     HPI:  · HPI Objective Statement: Pt is a 68 yo gentleman with no significant past medical history who presents to the ED with diminished appetite since last week upon inital questioning. However he states he saw a doctor in [place] approx 3 months ago at which time was instructed to follow up. prior to that doctor visit in [place] patient has not seen a physician in a number of years.  He was visiting his elderly mom who had not been feeling well and attributed  his decreased appetite to this event. Nevertheless he has since returned to [facility] one month ago and appetite continues to deteriorate. He only drinks water and juices and expressed frequent urination. Also c/o weight loss but unable to quantify.   Denies any abdominal pain, no n/v/d or fever. No headache, no dysuria. He is able to eat, just doesn't want to eat as much. He denies. No chest pain, no sob.   In ED BS was elevated to 615. (31 Jul 2017 14:29)  Diabetic Ketoacidosis better and now on Lantus 10 units and prandial lispro 6 units and Lispro sliding scale coverage with meals , finger sticks are in 250 range   also increased Hypercalcemia , no Aredia and on IV fluids . serum Calcium now 9.9       PAST MEDICAL & SURGICAL HISTORY:  No pertinent past medical history  No significant past surgical history      FAMILY HISTORY:  No pertinent family history in first degree relatives        Social History:    MEDICATIONS  (STANDING):  insulin lispro (HumaLOG) corrective regimen sliding scale   SubCutaneous three times a day before meals  insulin lispro (HumaLOG) corrective regimen sliding scale   SubCutaneous at bedtime  dextrose 5%. 1000 milliLiter(s) (50 mL/Hr) IV Continuous <Continuous>  dextrose 50% Injectable 12.5 Gram(s) IV Push once  dextrose 50% Injectable 25 Gram(s) IV Push once  dextrose 50% Injectable 25 Gram(s) IV Push once  heparin  Injectable 5000 Unit(s) SubCutaneous every 12 hours  insulin glargine Injectable (LANTUS) 10 Unit(s) SubCutaneous at bedtime  aspirin  chewable 81 milliGRAM(s) Oral daily  insulin lispro Injectable (HumaLOG) 6 Unit(s) SubCutaneous three times a day with meals  lisinopril 2.5 milliGRAM(s) Oral daily  atorvastatin 20 milliGRAM(s) Oral at bedtime    MEDICATIONS  (PRN):  dextrose Gel 1 Dose(s) Oral once PRN Blood Glucose LESS THAN 70 milliGRAM(s)/deciliter  glucagon  Injectable 1 milliGRAM(s) IntraMuscular once PRN Glucose LESS THAN 70 milligrams/deciliter      REVIEW OF SYSTEMS:  CONSTITUTIONAL:  as per HPI, rundown   HEENT:  Eyes:  No diplopia or blurred vision. ENT:  No earache, sore throat or runny nose.  CARDIOVASCULAR:  No pressure, squeezing, strangling, tightness, heaviness or aching about the chest, neck, axilla or epigastrium.  RESPIRATORY:  No cough, shortness of breath, PND or orthopnea.  GASTROINTESTINAL:  No nausea, vomiting or diarrhea.  GENITOURINARY:  No dysuria, frequency or urgency. No Blood in urine  MUSCULOSKELETAL:  no joint aches, no muscle pain, myalgia  SKIN:  No change in skin, hair or nails.  NEUROLOGIC:  No paresthesias, fasciculations, seizures or weakness.  PSYCHIATRIC:  No disorder of thought or mood.  ENDOCRINE:  No heat or cold intolerance, polyuria or polydipsia. abnormal weight gain or loss, oral thrush  HEMATOLOGICAL:  No easy bruising or bleeding.     T(C): 36.8 (08-02-17 @ 10:29), Max: 37.1 (08-02-17 @ 00:01)  HR: 102 (08-02-17 @ 10:29) (91 - 102)  BP: 127/75 (08-02-17 @ 10:29) (127/75 - 152/87)  RR: 16 (08-02-17 @ 10:29) (16 - 16)  SpO2: 95% (08-02-17 @ 10:29) (95% - 96%)  Wt(kg): --    PHYSICAL EXAM:  GENERAL: NAD,poorly nourished   no dehydration   HEAD:  Atraumatic, Normocephalic  EYES: EOMI, PERRLA, conjunctiva and sclera clear  ENMT: No tonsillar erythema, exudates, or enlargement; Moist mucous membranes, Good dentition, No lesions  NECK: Supple, No JVD, Normal thyroid  NERVOUS SYSTEM:  Alert & Oriented X3, Good concentration; Motor Strength 5/5 B/L upper and lower extremities; DTRs 2+ intact and symmetric  CHEST/LUNG: Clear to percussion bilaterally; No rales, rhonchi, wheezing, or rubs  HEART: Regular rate and rhythm; No murmurs, rubs, or gallops  ABDOMEN: Soft, Nontender, Nondistended; Bowel sounds present  EXTREMITIES:  2+ Peripheral Pulses, No clubbing, cyanosis, or edema  LYMPH: No lymphadenopathy noted  SKIN: No rashes or lesions    CAPILLARY BLOOD GLUCOSE  267 (02 Aug 2017 11:14)  290 (02 Aug 2017 08:08)  263 (01 Aug 2017 22:30)  266 (01 Aug 2017 16:56)                                15.4   8.9   )-----------( 201      ( 02 Aug 2017 06:41 )             46.2       CMP:  08-02 @ 06:41  SGPT --  Albumin --   Alk Phos --   Anion Gap 11   SGOT --   Total Bili --   BUN 12   Calcium Total 9.2   CO2 27   Chloride 103   Creatinine 0.85   eGFR if    eGFR if non AA 90   Glucose 290   Potassium 3.6   Protein --   Sodium 141      Thyroid Function Tests:      Diabetes Tests:     Parathyroids:     Adrenals:       Radiology:

## 2017-08-02 NOTE — CONSULT NOTE ADULT - SUBJECTIVE AND OBJECTIVE BOX
HPI: 68 y/o without past medical or surgical hx, c/o of increased thirst, increased urination and weight loss. found to be hyperglycemic on admission. left upper lobe lung mass also noted. denies SOB or chest pain.pt is a non - smoker.        PAST MEDICAL & SURGICAL HISTORY:  No pertinent past medical history  No significant past surgical history      FAMILY HISTORY:  No pertinent family history in first degree relatives    SOCIAL HISTORY: non smoker    Allergies  No Known Allergies          MEDICATIONS  (STANDING):  insulin lispro (HumaLOG) corrective regimen sliding scale   SubCutaneous three times a day before meals  insulin lispro (HumaLOG) corrective regimen sliding scale   SubCutaneous at bedtime  dextrose 5%. 1000 milliLiter(s) (50 mL/Hr) IV Continuous <Continuous>  dextrose 50% Injectable 12.5 Gram(s) IV Push once  dextrose 50% Injectable 25 Gram(s) IV Push once  dextrose 50% Injectable 25 Gram(s) IV Push once  heparin  Injectable 5000 Unit(s) SubCutaneous every 12 hours  insulin glargine Injectable (LANTUS) 10 Unit(s) SubCutaneous at bedtime  aspirin  chewable 81 milliGRAM(s) Oral daily  insulin lispro Injectable (HumaLOG) 6 Unit(s) SubCutaneous three times a day with meals  lisinopril 2.5 milliGRAM(s) Oral daily  atorvastatin 20 milliGRAM(s) Oral at bedtime    MEDICATIONS  (PRN):  dextrose Gel 1 Dose(s) Oral once PRN Blood Glucose LESS THAN 70 milliGRAM(s)/deciliter  glucagon  Injectable 1 milliGRAM(s) IntraMuscular once PRN Glucose LESS THAN 70 milligrams/deciliter    REVIEW OF SYSTEMS:    Constitutional:            No fever, weight loss or fatigue  HEENT:                         No difficulty hearing, tinnitus, vertigo; No sinus or throat pain  Respiratory:                     negative  Cardiovascular:           No chest pain, palpitations  Gastrointestinal:        No abdominal or epigastric pain. No N/V/diarrhea or hematemesis  Genitourinary:            No dysuria, frequency, hematuria or incontinence  SKIN:                             no rash  Musculoskeletal:        No joint pain or swelling  Extremities:                No swelling  Neurological:              No headaches  Psychiatric:                 No depression, anxiety    PQRS:  Vaccines - Influenza and Pneumovax:  BMI:  Tobacco:  Depression:   Colorectal Screening:  Breast Cancer Screening:  Blood Presssure Screening / Control of:  HbAIc:  Ischemic Vascular Disease:  Current Medications Reviewed:    Vital Signs Last 24 Hrs  T(C): 36.4 (02 Aug 2017 17:22), Max: 37.1 (02 Aug 2017 00:01)  T(F): 97.6 (02 Aug 2017 17:22), Max: 98.8 (02 Aug 2017 00:01)  HR: 82 (02 Aug 2017 17:22) (82 - 102)  BP: 145/96 (02 Aug 2017 17:22) (127/75 - 152/87)  BP(mean): --  RR: 18 (02 Aug 2017 17:22) (16 - 18)  SpO2: 98% (02 Aug 2017 17:22) (95% - 98%)    PHYSICAL EXAM:  GEN:         Awake, responsive and comfortable.  HEENT:    Normal.    RESP:     clear bilaterally  CVS:             Regular rate and rhythm.   ABD:         Soft, non-tender, non-distended;   :             No costovertebral angle tenderness  SKIN:           Warm and dry.  EXTR:            No clubbing, cyanosis or edema  CNS:              Intact sensory and motor function.  PSYCH:        cooperative, no anxiety or depression            LABS:                        15.4   8.9   )-----------( 201      ( 02 Aug 2017 06:41 )             46.2     08-02    141  |  103  |  12  ----------------------------<  290<H>  3.6   |  27  |  0.85    Ca    9.2      02 Aug 2017 06:41  Phos  3.1     08-02  Mg     2.3     08-01                  EKG:     RADIOLOGY & ADDITIONAL STUDIES: < from: CT Chest No Cont (08.01.17 @ 00:49) >    EXAM:  CT CHEST                            PROCEDURE DATE:  08/01/2017          INTERPRETATION:  CLINICAL INFORMATION: Lung mass    COMPARISON: No prior CT studies are available for comparison.    PROCEDURE:     Serial axial sections through the chest were obtained without   administration of nonionic intravenous contrast. Sagittal and coronal   reformats were then generated from the axial images.    FINDINGS:     LUNG AND LARGE AIRWAYS: Central left upper lobe/suprahilar mass measuring   approximately 3.6 x 3.4 x 3.7 cm. Nodular endobronchial component   measuring 1 cm noted within the left upper lobe bronchus. Left upper lobe   atelectasis. Nodules within the right lower lobe measuring 2 mm and 5 mm.   Right lower lobe atelectasis.  PLEURA: No pleural effusions.  VESSELS: Atherosclerotic changes of the aorta and coronary arteries.  HEART: Normal size. No pericardial effusion.  MEDIASTINUM AND LANI: Within normal limits.  CHEST WALL AND LOWER NECK: Within normal limits.    UPPER ABDOMEN: Low-attenuation of the liver. Adrenal thickening.    BONES: Spinal degenerative changes.    IMPRESSION:     Left upper lobe/suprahilar central lung mass (3.6 x 3.4 x 3.7 cm) with   nodular endobronchial component (1 cm) noted within the left upper lobe   bronchus.  Additional nodules within the right lower lobe measuring 2 mm and 5 mm.                    SENTHIL MENDENHALL M.D., ATTENDING RADIOLOGIST    < end of copied text >      ASSESSMENT AND PLAN:Left upper lobe lung mass. to schedule bronchoscopy with biopsy, brushings and BAL.

## 2017-08-02 NOTE — DIETITIAN INITIAL EVALUATION ADULT. - NS AS NUTRI INTERV ED CONTENT
Purpose of the nutrition education/Recommended modifications/provided diabetes education & type 2 diabetes nutrition handout material & you & diabetes manual

## 2017-08-02 NOTE — PROGRESS NOTE ADULT - SUBJECTIVE AND OBJECTIVE BOX
GENERAL SURGERY CONSULT NOTE    Patient is a 67y old  Male who presents with a chief complaint of poor apptiite (01 Aug 2017 01:15)      HPI:  · HPI Objective Statement: Pt is a 68 yo gentleman with no significant past medical history who presents to the ED with diminished appetite since last week upon inital questioning. However he states he saw a doctor in [place] approx 3 months ago at which time was instructed to follow up. prior to that doctor visit in [place] patient has not seen a physician in a number of years.  He was visiting his elderly mom who had not been feeling well and attributed  his decreased appetite to this event. Nevertheless [facility] and appetite continues to deteriorate. He only drinks water and juices and expressed frequent urination. Also c/o weight loss but unable to quantify.   Denies any abdominal pain, no n/v/d or fever. No headache, no dysuria. He is able to eat, just doesn't want to eat as much. He denies. No chest pain, no sob.   In ED BS was elevated to 615. (31 Jul 2017 14:29)    Vascular Surgery called for dec pulse on Left LE and Doppler results. US Duplex Arterial Lower Ext Compl, Bilateral (08.01.17 @ 11:37) The bilateral external iliac, common femoral, deep femoral, superficial femoral and popliteal arteries demonstrate normal phasic wave form. The bilateral posterior tibial, bilateral peroneal, right anterior tibial and bilateral dorsalis pedis arteries are patent. 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    Patient with no complaint of LE pain/claudication.  Though complaint of chronic intermittent paresthesia on distal bilateral toes.          PAST MEDICAL & SURGICAL HISTORY:  No pertinent past medical history  No significant past surgical history      Review of Systems:    I have reviewed 9 systems with the patient and the only positive findings were     MEDICATIONS  (STANDING):  insulin lispro (HumaLOG) corrective regimen sliding scale   SubCutaneous three times a day before meals  insulin lispro (HumaLOG) corrective regimen sliding scale   SubCutaneous at bedtime  dextrose 5%. 1000 milliLiter(s) (50 mL/Hr) IV Continuous <Continuous>  dextrose 50% Injectable 12.5 Gram(s) IV Push once  dextrose 50% Injectable 25 Gram(s) IV Push once  dextrose 50% Injectable 25 Gram(s) IV Push once  heparin  Injectable 5000 Unit(s) SubCutaneous every 12 hours  insulin glargine Injectable (LANTUS) 10 Unit(s) SubCutaneous at bedtime  aspirin  chewable 81 milliGRAM(s) Oral daily  insulin lispro Injectable (HumaLOG) 6 Unit(s) SubCutaneous three times a day with meals  lisinopril 2.5 milliGRAM(s) Oral daily  atorvastatin 20 milliGRAM(s) Oral at bedtime    MEDICATIONS  (PRN):  dextrose Gel 1 Dose(s) Oral once PRN Blood Glucose LESS THAN 70 milliGRAM(s)/deciliter  glucagon  Injectable 1 milliGRAM(s) IntraMuscular once PRN Glucose LESS THAN 70 milligrams/deciliter      Allergies    No Known Allergies    Intolerances        SOCIAL HISTORY          Smoking: Yes [ ]  No [ ]   ______pk yrs          ETOH  Yes [ ]  No [ ]  Social [ ]          DRUGS:  Yes [ ]  No [ ]  if so what______________    FAMILY HISTORY:  No pertinent family history in first degree relatives      Vital Signs Last 24 Hrs  T(C): 36.8 (02 Aug 2017 10:29), Max: 37.1 (01 Aug 2017 16:23)  T(F): 98.2 (02 Aug 2017 10:29), Max: 98.8 (01 Aug 2017 16:23)  HR: 102 (02 Aug 2017 10:29) (91 - 102)  BP: 127/75 (02 Aug 2017 10:29) (127/75 - 152/87)  BP(mean): --  RR: 16 (02 Aug 2017 10:29) (16 - 16)  SpO2: 95% (02 Aug 2017 10:29) (95% - 98%)    Physical Exam:    General:  Appears stated age, well-groomed, well-nourished, no distress  Eyes : NANCY  HENT:  WNL, no JVD  Chest:  clear breath sounds  Cardiovascular:  Regular rate & rhythm  Abdomen:    Extremities:  Gait & station:    Skin:  No rash  Left LE --- good hair distribution, no skin lesion, palpable DP, intact touch, strenght 5/5    LABS:                        15.4   8.9   )-----------( 201      ( 02 Aug 2017 06:41 )             46.2     08-02    141  |  103  |  12  ----------------------------<  290<H>  3.6   |  27  |  0.85    Ca    9.2      02 Aug 2017 06:41  Phos  3.1     08-02  Mg     2.3     08-01      A/P     68 yo m no reported pmhx, presenting with diminished appetite since last weeks. However he states he saw a doctor in [place] approx 3 months ago at which time was instructed to follow up. prior to that doctor visit in [place] patient has not seen a physician in a number of years.  He was visiting his elderly mom who had not been feeling well and attributed  his decreased appetite to this event. Nevertheless [facility] and appetite continues to deteriorate. He only drinks water and juices and expressed frequent urination. Also c/o weight loss but unable to quantify.  Glucose 615 on presentation, also found to have L lung mass. Patient with ant tibial artery occlusion, prob chronic atherosclerosis etio     -- discuss case with Dr. Shen.  No acute Vascular surgical intervention at this time.  Patient can follow up as outpatient for further workup.    -- continue medical management/supportive care   -- prophylactic measure   -- antiplatelet -- asa 81 daily   -- glycemic control   -- cholesterol control

## 2017-08-03 DIAGNOSIS — I74.3 EMBOLISM AND THROMBOSIS OF ARTERIES OF THE LOWER EXTREMITIES: ICD-10-CM

## 2017-08-03 PROCEDURE — 99233 SBSQ HOSP IP/OBS HIGH 50: CPT

## 2017-08-03 RX ORDER — INSULIN LISPRO 100/ML
8 VIAL (ML) SUBCUTANEOUS
Qty: 0 | Refills: 0 | Status: DISCONTINUED | OUTPATIENT
Start: 2017-08-03 | End: 2017-08-04

## 2017-08-03 RX ORDER — INSULIN GLARGINE 100 [IU]/ML
12 INJECTION, SOLUTION SUBCUTANEOUS AT BEDTIME
Qty: 0 | Refills: 0 | Status: DISCONTINUED | OUTPATIENT
Start: 2017-08-03 | End: 2017-08-03

## 2017-08-03 RX ORDER — INSULIN GLARGINE 100 [IU]/ML
14 INJECTION, SOLUTION SUBCUTANEOUS AT BEDTIME
Qty: 0 | Refills: 0 | Status: DISCONTINUED | OUTPATIENT
Start: 2017-08-03 | End: 2017-08-04

## 2017-08-03 RX ADMIN — Medication 6 UNIT(S): at 08:59

## 2017-08-03 RX ADMIN — Medication 8: at 08:58

## 2017-08-03 RX ADMIN — Medication 6: at 17:20

## 2017-08-03 RX ADMIN — Medication 12: at 11:53

## 2017-08-03 RX ADMIN — Medication 8 UNIT(S): at 11:53

## 2017-08-03 RX ADMIN — HEPARIN SODIUM 5000 UNIT(S): 5000 INJECTION INTRAVENOUS; SUBCUTANEOUS at 17:21

## 2017-08-03 RX ADMIN — Medication 8 UNIT(S): at 17:20

## 2017-08-03 RX ADMIN — LISINOPRIL 2.5 MILLIGRAM(S): 2.5 TABLET ORAL at 05:23

## 2017-08-03 RX ADMIN — HEPARIN SODIUM 5000 UNIT(S): 5000 INJECTION INTRAVENOUS; SUBCUTANEOUS at 05:23

## 2017-08-03 RX ADMIN — ATORVASTATIN CALCIUM 20 MILLIGRAM(S): 80 TABLET, FILM COATED ORAL at 21:21

## 2017-08-03 RX ADMIN — Medication 81 MILLIGRAM(S): at 11:52

## 2017-08-03 NOTE — PROGRESS NOTE ADULT - SUBJECTIVE AND OBJECTIVE BOX
INTERVAL HPI/OVERNIGHT EVENTS:  denies SOB or chest pain    Vital Signs Last 24 Hrs  T(C): 36.7 (03 Aug 2017 23:04), Max: 36.8 (03 Aug 2017 16:46)  T(F): 98 (03 Aug 2017 23:04), Max: 98.2 (03 Aug 2017 16:46)  HR: 76 (03 Aug 2017 23:04) (76 - 90)  BP: 107/76 (03 Aug 2017 23:04) (107/76 - 134/83)  BP(mean): --  RR: 15 (03 Aug 2017 23:04) (15 - 18)  SpO2: 98% (03 Aug 2017 23:04) (96% - 98%)        PHYSICAL EXAM:  GEN:         Awake, responsive and comfortable.  HEENT:    Normal.    RESP:     clear bilat.  CVS:             Regular rate and rhythm.   ABD:         Soft, non-tender, non-distended;   :             No costovertebral angle tenderness  EXTR:            No clubbing, cyanosis or edema  CNS:              Intact sensory and motor function.        MEDICATIONS  (STANDING):  insulin lispro (HumaLOG) corrective regimen sliding scale   SubCutaneous three times a day before meals  insulin lispro (HumaLOG) corrective regimen sliding scale   SubCutaneous at bedtime  dextrose 5%. 1000 milliLiter(s) (50 mL/Hr) IV Continuous <Continuous>  dextrose 50% Injectable 12.5 Gram(s) IV Push once  dextrose 50% Injectable 25 Gram(s) IV Push once  dextrose 50% Injectable 25 Gram(s) IV Push once  heparin  Injectable 5000 Unit(s) SubCutaneous every 12 hours  aspirin  chewable 81 milliGRAM(s) Oral daily  lisinopril 2.5 milliGRAM(s) Oral daily  atorvastatin 20 milliGRAM(s) Oral at bedtime  insulin lispro Injectable (HumaLOG) 8 Unit(s) SubCutaneous three times a day with meals  insulin glargine Injectable (LANTUS) 14 Unit(s) SubCutaneous at bedtime    MEDICATIONS  (PRN):  dextrose Gel 1 Dose(s) Oral once PRN Blood Glucose LESS THAN 70 milliGRAM(s)/deciliter  glucagon  Injectable 1 milliGRAM(s) IntraMuscular once PRN Glucose LESS THAN 70 milligrams/deciliter        LABS:                        15.4   8.9   )-----------( 201      ( 02 Aug 2017 06:41 )             46.2     08-02    141  |  103  |  12  ----------------------------<  290<H>  3.6   |  27  |  0.85    Ca    9.2      02 Aug 2017 06:41  Phos  3.1     08-02                RADIOLOGY & ADDITIONAL STUDIES:    ASSESSMENT AND PLAN: consent obtained and bronchoscopy scheduled for tommorrow. NPO after midnight, d/c lantus and heparin, check coags.

## 2017-08-03 NOTE — PROGRESS NOTE ADULT - SUBJECTIVE AND OBJECTIVE BOX
Patient is a 67y old  Male who presents with a chief complaint of poor apptiite (01 Aug 2017 01:15)      INTERVAL HPI/OVERNIGHT EVENTS: feels well, no issues overnight     MEDICATIONS  (STANDING):  insulin lispro (HumaLOG) corrective regimen sliding scale   SubCutaneous three times a day before meals  insulin lispro (HumaLOG) corrective regimen sliding scale   SubCutaneous at bedtime  dextrose 5%. 1000 milliLiter(s) (50 mL/Hr) IV Continuous <Continuous>  dextrose 50% Injectable 12.5 Gram(s) IV Push once  dextrose 50% Injectable 25 Gram(s) IV Push once  dextrose 50% Injectable 25 Gram(s) IV Push once  heparin  Injectable 5000 Unit(s) SubCutaneous every 12 hours  aspirin  chewable 81 milliGRAM(s) Oral daily  lisinopril 2.5 milliGRAM(s) Oral daily  atorvastatin 20 milliGRAM(s) Oral at bedtime  insulin glargine Injectable (LANTUS) 12 Unit(s) SubCutaneous at bedtime  insulin lispro Injectable (HumaLOG) 8 Unit(s) SubCutaneous three times a day with meals    MEDICATIONS  (PRN):  dextrose Gel 1 Dose(s) Oral once PRN Blood Glucose LESS THAN 70 milliGRAM(s)/deciliter  glucagon  Injectable 1 milliGRAM(s) IntraMuscular once PRN Glucose LESS THAN 70 milligrams/deciliter      Allergies    No Known Allergies    Intolerances        REVIEW OF SYSTEMS:  CONSTITUTIONAL: No fever, weight loss, or fatigue  EYES: No eye pain, visual disturbances, or discharge  ENMT:  No difficulty hearing, tinnitus, vertigo; No sinus or throat pain  NECK: No pain or stiffness  BREASTS: No pain, masses, or nipple discharge  RESPIRATORY: No cough, wheezing, chills or hemoptysis; No shortness of breath  CARDIOVASCULAR: No chest pain, palpitations, dizziness, or leg swelling  GASTROINTESTINAL: No abdominal or epigastric pain. No nausea, vomiting, or hematemesis; No diarrhea or constipation. No melena or hematochezia.  GENITOURINARY: No dysuria, frequency, hematuria, or incontinence  NEUROLOGICAL: No headaches, memory loss, loss of strength, numbness, or tremors  SKIN: No itching, burning, rashes, or lesions   LYMPH NODES: No enlarged glands  ENDOCRINE: No heat or cold intolerance; No hair loss  MUSCULOSKELETAL: No joint pain or swelling; No muscle, back, or extremity pain  PSYCHIATRIC: No depression, anxiety, mood swings, or difficulty sleeping  HEME/LYMPH: No easy bruising, or bleeding gums  ALLERGY AND IMMUNOLOGIC: No hives or eczema    Vital Signs Last 24 Hrs  T(C): 36.6 (03 Aug 2017 10:55), Max: 36.6 (03 Aug 2017 05:11)  T(F): 97.9 (03 Aug 2017 10:55), Max: 97.9 (03 Aug 2017 10:55)  HR: 90 (03 Aug 2017 10:55) (76 - 90)  BP: 134/73 (03 Aug 2017 10:55) (118/76 - 145/96)  BP(mean): --  RR: 16 (03 Aug 2017 10:55) (16 - 18)  SpO2: 98% (03 Aug 2017 10:55) (96% - 98%)    PHYSICAL EXAM:  GENERAL: NAD, well-groomed, well-developed  HEAD:  Atraumatic, Normocephalic  EYES: EOMI, PERRLA, conjunctiva and sclera clear  ENMT: No tonsillar erythema, exudates, or enlargement; Moist mucous membranes, Good dentition, No lesions  NECK: Supple, No JVD, Normal thyroid  NERVOUS SYSTEM:  Alert & Oriented X3, Good concentration; Motor Strength 5/5 B/L upper and lower extremities; DTRs 2+ intact and symmetric  CHEST/LUNG: Clear to percussion bilaterally; No rales, rhonchi, wheezing, or rubs  HEART: Regular rate and rhythm; No murmurs, rubs, or gallops  ABDOMEN: Soft, Nontender, Nondistended; Bowel sounds present  EXTREMITIES:  2+ Peripheral Pulses, No clubbing, cyanosis, or edema  LYMPH: No lymphadenopathy noted  SKIN: No rashes or lesions    LABS:                        15.4   8.9   )-----------( 201      ( 02 Aug 2017 06:41 )             46.2     08-02    141  |  103  |  12  ----------------------------<  290<H>  3.6   |  27  |  0.85    Ca    9.2      02 Aug 2017 06:41  Phos  3.1     08-02          CAPILLARY BLOOD GLUCOSE  321 (03 Aug 2017 08:57)  298 (02 Aug 2017 21:17)  206 (02 Aug 2017 16:56)          RADIOLOGY & ADDITIONAL TESTS:    Imaging Personally Reviewed:  [ ] YES  [ ] NO    Consultant(s) Notes Reviewed:  [ ] YES  [ ] NO    Care Discussed with Consultants/Other Providers [ ] YES  [ ] NO

## 2017-08-03 NOTE — PROGRESS NOTE ADULT - PROBLEM SELECTOR PLAN 1
new onset DM. not in DKA as AG at 17 per [facility] labs.   a1c elevated, c/w insulin   increased mealtime insulin and lantus, as finger sticks still high    Dm education   Nutrition education    lipitor for HLD, monitoring for side effects

## 2017-08-03 NOTE — PROGRESS NOTE ADULT - PROBLEM SELECTOR PLAN 3
Left upper lobe/suprahilar central lung mass (3.6 x 3.4 x 3.7 cm) with   nodular endobronchial component (1 cm) noted within the left upper lobe   bronchus.Additional nodules within the right lower lobe measuring 2 mm and 5 mm.    denies cough or smoking h/o or night sweat. admit to weight loss and decreased appetite    Pulmonary eval appreciated, patient will require bronchoscopy

## 2017-08-04 ENCOUNTER — RESULT REVIEW (OUTPATIENT)
Age: 67
End: 2017-08-04

## 2017-08-04 LAB
APTT BLD: 30 SEC — SIGNIFICANT CHANGE UP (ref 27.5–37.4)
INR BLD: 1.14 RATIO — SIGNIFICANT CHANGE UP (ref 0.88–1.16)
PROTHROM AB SERPL-ACNC: 12.5 SEC — SIGNIFICANT CHANGE UP (ref 9.8–12.7)

## 2017-08-04 PROCEDURE — 88112 CYTOPATH CELL ENHANCE TECH: CPT | Mod: 26

## 2017-08-04 PROCEDURE — 99233 SBSQ HOSP IP/OBS HIGH 50: CPT

## 2017-08-04 PROCEDURE — 71010: CPT | Mod: 26

## 2017-08-04 PROCEDURE — 88305 TISSUE EXAM BY PATHOLOGIST: CPT | Mod: 26

## 2017-08-04 RX ORDER — AMLODIPINE BESYLATE 2.5 MG/1
5 TABLET ORAL DAILY
Qty: 0 | Refills: 0 | Status: DISCONTINUED | OUTPATIENT
Start: 2017-08-04 | End: 2017-08-07

## 2017-08-04 RX ORDER — INSULIN GLARGINE 100 [IU]/ML
14 INJECTION, SOLUTION SUBCUTANEOUS AT BEDTIME
Qty: 0 | Refills: 0 | Status: DISCONTINUED | OUTPATIENT
Start: 2017-08-04 | End: 2017-08-04

## 2017-08-04 RX ORDER — DEXTROSE 50 % IN WATER 50 %
25 SYRINGE (ML) INTRAVENOUS ONCE
Qty: 0 | Refills: 0 | Status: DISCONTINUED | OUTPATIENT
Start: 2017-08-04 | End: 2017-08-07

## 2017-08-04 RX ORDER — INSULIN LISPRO 100/ML
VIAL (ML) SUBCUTANEOUS
Qty: 0 | Refills: 0 | Status: DISCONTINUED | OUTPATIENT
Start: 2017-08-04 | End: 2017-08-07

## 2017-08-04 RX ORDER — GLUCAGON INJECTION, SOLUTION 0.5 MG/.1ML
1 INJECTION, SOLUTION SUBCUTANEOUS ONCE
Qty: 0 | Refills: 0 | Status: DISCONTINUED | OUTPATIENT
Start: 2017-08-04 | End: 2017-08-07

## 2017-08-04 RX ORDER — SODIUM CHLORIDE 9 MG/ML
3 INJECTION INTRAMUSCULAR; INTRAVENOUS; SUBCUTANEOUS EVERY 8 HOURS
Qty: 0 | Refills: 0 | Status: DISCONTINUED | OUTPATIENT
Start: 2017-08-04 | End: 2017-08-07

## 2017-08-04 RX ORDER — HEPARIN SODIUM 5000 [USP'U]/ML
5000 INJECTION INTRAVENOUS; SUBCUTANEOUS EVERY 12 HOURS
Qty: 0 | Refills: 0 | Status: DISCONTINUED | OUTPATIENT
Start: 2017-08-05 | End: 2017-08-07

## 2017-08-04 RX ORDER — SODIUM CHLORIDE 9 MG/ML
1000 INJECTION, SOLUTION INTRAVENOUS
Qty: 0 | Refills: 0 | Status: DISCONTINUED | OUTPATIENT
Start: 2017-08-04 | End: 2017-08-04

## 2017-08-04 RX ORDER — INSULIN GLARGINE 100 [IU]/ML
14 INJECTION, SOLUTION SUBCUTANEOUS AT BEDTIME
Qty: 0 | Refills: 0 | Status: DISCONTINUED | OUTPATIENT
Start: 2017-08-04 | End: 2017-08-05

## 2017-08-04 RX ORDER — DEXTROSE 50 % IN WATER 50 %
12.5 SYRINGE (ML) INTRAVENOUS ONCE
Qty: 0 | Refills: 0 | Status: DISCONTINUED | OUTPATIENT
Start: 2017-08-04 | End: 2017-08-07

## 2017-08-04 RX ORDER — SODIUM CHLORIDE 9 MG/ML
1000 INJECTION, SOLUTION INTRAVENOUS
Qty: 0 | Refills: 0 | Status: DISCONTINUED | OUTPATIENT
Start: 2017-08-04 | End: 2017-08-07

## 2017-08-04 RX ORDER — DEXTROSE 50 % IN WATER 50 %
1 SYRINGE (ML) INTRAVENOUS ONCE
Qty: 0 | Refills: 0 | Status: DISCONTINUED | OUTPATIENT
Start: 2017-08-04 | End: 2017-08-07

## 2017-08-04 RX ORDER — ONDANSETRON 8 MG/1
4 TABLET, FILM COATED ORAL ONCE
Qty: 0 | Refills: 0 | Status: DISCONTINUED | OUTPATIENT
Start: 2017-08-04 | End: 2017-08-04

## 2017-08-04 RX ORDER — LISINOPRIL 2.5 MG/1
2.5 TABLET ORAL DAILY
Qty: 0 | Refills: 0 | Status: DISCONTINUED | OUTPATIENT
Start: 2017-08-04 | End: 2017-08-05

## 2017-08-04 RX ORDER — FENTANYL CITRATE 50 UG/ML
50 INJECTION INTRAVENOUS ONCE
Qty: 0 | Refills: 0 | Status: DISCONTINUED | OUTPATIENT
Start: 2017-08-04 | End: 2017-08-04

## 2017-08-04 RX ORDER — ATORVASTATIN CALCIUM 80 MG/1
20 TABLET, FILM COATED ORAL AT BEDTIME
Qty: 0 | Refills: 0 | Status: DISCONTINUED | OUTPATIENT
Start: 2017-08-04 | End: 2017-08-07

## 2017-08-04 RX ADMIN — SODIUM CHLORIDE 3 MILLILITER(S): 9 INJECTION INTRAMUSCULAR; INTRAVENOUS; SUBCUTANEOUS at 21:55

## 2017-08-04 RX ADMIN — LISINOPRIL 2.5 MILLIGRAM(S): 2.5 TABLET ORAL at 05:02

## 2017-08-04 RX ADMIN — ATORVASTATIN CALCIUM 20 MILLIGRAM(S): 80 TABLET, FILM COATED ORAL at 21:19

## 2017-08-04 RX ADMIN — INSULIN GLARGINE 14 UNIT(S): 100 INJECTION, SOLUTION SUBCUTANEOUS at 22:32

## 2017-08-04 NOTE — PROGRESS NOTE ADULT - PROBLEM SELECTOR PLAN 3
Left upper lobe/suprahilar central lung mass (3.6 x 3.4 x 3.7 cm) with   nodular endobronchial component (1 cm) noted within the left upper lobe   bronchus.Additional nodules within the right lower lobe measuring 2 mm and 5 mm.    denies cough or smoking h/o or night sweat. admit to weight loss and decreased appetite    Pulmonary eval appreciated, Bronchoscopy today, f/u results

## 2017-08-04 NOTE — PROGRESS NOTE ADULT - SUBJECTIVE AND OBJECTIVE BOX
Patient is a 67y old  Male who presents with a chief complaint of poor apptiite (01 Aug 2017 01:15)      INTERVAL HPI/OVERNIGHT EVENTS: no issues overnight, pt npo for procedure    MEDICATIONS  (STANDING):  insulin lispro (HumaLOG) corrective regimen sliding scale   SubCutaneous three times a day before meals  insulin lispro (HumaLOG) corrective regimen sliding scale   SubCutaneous at bedtime  dextrose 5%. 1000 milliLiter(s) (50 mL/Hr) IV Continuous <Continuous>  dextrose 50% Injectable 12.5 Gram(s) IV Push once  dextrose 50% Injectable 25 Gram(s) IV Push once  dextrose 50% Injectable 25 Gram(s) IV Push once  lisinopril 2.5 milliGRAM(s) Oral daily  atorvastatin 20 milliGRAM(s) Oral at bedtime  insulin lispro Injectable (HumaLOG) 8 Unit(s) SubCutaneous three times a day with meals  insulin glargine Injectable (LANTUS) 14 Unit(s) SubCutaneous at bedtime    MEDICATIONS  (PRN):  dextrose Gel 1 Dose(s) Oral once PRN Blood Glucose LESS THAN 70 milliGRAM(s)/deciliter  glucagon  Injectable 1 milliGRAM(s) IntraMuscular once PRN Glucose LESS THAN 70 milligrams/deciliter      Allergies    No Known Allergies    Intolerances        REVIEW OF SYSTEMS:  CONSTITUTIONAL: No fever, weight loss, or fatigue  EYES: No eye pain, visual disturbances, or discharge  ENMT:  No difficulty hearing, tinnitus, vertigo; No sinus or throat pain  NECK: No pain or stiffness  BREASTS: No pain, masses, or nipple discharge  RESPIRATORY: No cough, wheezing, chills or hemoptysis; No shortness of breath  CARDIOVASCULAR: No chest pain, palpitations, dizziness, or leg swelling  GASTROINTESTINAL: No abdominal or epigastric pain. No nausea, vomiting, or hematemesis; No diarrhea or constipation. No melena or hematochezia.  GENITOURINARY: No dysuria, frequency, hematuria, or incontinence  NEUROLOGICAL: No headaches, memory loss, loss of strength, numbness, or tremors  SKIN: No itching, burning, rashes, or lesions   LYMPH NODES: No enlarged glands  ENDOCRINE: No heat or cold intolerance; No hair loss  MUSCULOSKELETAL: No joint pain or swelling; No muscle, back, or extremity pain  PSYCHIATRIC: No depression, anxiety, mood swings, or difficulty sleeping  HEME/LYMPH: No easy bruising, or bleeding gums  ALLERGY AND IMMUNOLOGIC: No hives or eczema    Vital Signs Last 24 Hrs  T(C): 37 (04 Aug 2017 10:38), Max: 37 (04 Aug 2017 10:38)  T(F): 98.6 (04 Aug 2017 10:38), Max: 98.6 (04 Aug 2017 10:38)  HR: 80 (04 Aug 2017 10:38) (76 - 80)  BP: 127/80 (04 Aug 2017 10:38) (107/76 - 134/83)  BP(mean): --  RR: 18 (04 Aug 2017 10:38) (15 - 18)  SpO2: 98% (04 Aug 2017 10:38) (98% - 99%)    PHYSICAL EXAM:  GENERAL: NAD, well-groomed, well-developed  HEAD:  Atraumatic, Normocephalic  EYES: EOMI, PERRLA, conjunctiva and sclera clear  ENMT: No tonsillar erythema, exudates, or enlargement; Moist mucous membranes, Good dentition, No lesions  NECK: Supple, No JVD, Normal thyroid  NERVOUS SYSTEM:  Alert & Oriented X3, Good concentration; Motor Strength 5/5 B/L upper and lower extremities; DTRs 2+ intact and symmetric  CHEST/LUNG: Clear to percussion bilaterally; No rales, rhonchi, wheezing, or rubs  HEART: Regular rate and rhythm; No murmurs, rubs, or gallops  ABDOMEN: Soft, Nontender, Nondistended; Bowel sounds present  EXTREMITIES:  2+ Peripheral Pulses, No clubbing, cyanosis, or edema  LYMPH: No lymphadenopathy noted  SKIN: No rashes or lesions    LABS:          PT/INR - ( 04 Aug 2017 07:58 )   PT: 12.5 sec;   INR: 1.14 ratio         PTT - ( 04 Aug 2017 07:58 )  PTT:30.0 sec    CAPILLARY BLOOD GLUCOSE  237 (04 Aug 2017 11:46)  263 (04 Aug 2017 07:57)  258 (03 Aug 2017 21:20)  275 (03 Aug 2017 17:18)  320 (03 Aug 2017 12:39)          RADIOLOGY & ADDITIONAL TESTS:    Imaging Personally Reviewed:  [ ] YES  [ ] NO    Consultant(s) Notes Reviewed:  [ ] YES  [ ] NO    Care Discussed with Consultants/Other Providers [ ] YES  [ ] NO

## 2017-08-04 NOTE — BRIEF OPERATIVE NOTE - PRE-OP DX
Lung mass  08/04/2017  left upper lobe lung mass with extension in to left upper lobe bronchus  Active  Marcos Ervin

## 2017-08-05 LAB
ANION GAP SERPL CALC-SCNC: 12 MMOL/L — SIGNIFICANT CHANGE UP (ref 5–17)
BUN SERPL-MCNC: 10 MG/DL — SIGNIFICANT CHANGE UP (ref 7–23)
CALCIUM SERPL-MCNC: 8.8 MG/DL — SIGNIFICANT CHANGE UP (ref 8.5–10.1)
CHLORIDE SERPL-SCNC: 102 MMOL/L — SIGNIFICANT CHANGE UP (ref 96–108)
CO2 SERPL-SCNC: 25 MMOL/L — SIGNIFICANT CHANGE UP (ref 22–31)
CREAT SERPL-MCNC: 0.74 MG/DL — SIGNIFICANT CHANGE UP (ref 0.5–1.3)
GLUCOSE SERPL-MCNC: 269 MG/DL — HIGH (ref 70–99)
GRAM STN FLD: SIGNIFICANT CHANGE UP
HCT VFR BLD CALC: 45.1 % — SIGNIFICANT CHANGE UP (ref 39–50)
HGB BLD-MCNC: 15 G/DL — SIGNIFICANT CHANGE UP (ref 13–17)
MCHC RBC-ENTMCNC: 30.5 PG — SIGNIFICANT CHANGE UP (ref 27–34)
MCHC RBC-ENTMCNC: 33.3 GM/DL — SIGNIFICANT CHANGE UP (ref 32–36)
MCV RBC AUTO: 91.5 FL — SIGNIFICANT CHANGE UP (ref 80–100)
NIGHT BLUE STAIN TISS: SIGNIFICANT CHANGE UP
PLATELET # BLD AUTO: 178 K/UL — SIGNIFICANT CHANGE UP (ref 150–400)
POTASSIUM SERPL-MCNC: 3.8 MMOL/L — SIGNIFICANT CHANGE UP (ref 3.5–5.3)
POTASSIUM SERPL-SCNC: 3.8 MMOL/L — SIGNIFICANT CHANGE UP (ref 3.5–5.3)
RBC # BLD: 4.93 M/UL — SIGNIFICANT CHANGE UP (ref 4.2–5.8)
RBC # FLD: 11.3 % — SIGNIFICANT CHANGE UP (ref 11–15)
SODIUM SERPL-SCNC: 139 MMOL/L — SIGNIFICANT CHANGE UP (ref 135–145)
SPECIMEN SOURCE: SIGNIFICANT CHANGE UP
SPECIMEN SOURCE: SIGNIFICANT CHANGE UP
WBC # BLD: 9.2 K/UL — SIGNIFICANT CHANGE UP (ref 3.8–10.5)
WBC # FLD AUTO: 9.2 K/UL — SIGNIFICANT CHANGE UP (ref 3.8–10.5)

## 2017-08-05 PROCEDURE — 99233 SBSQ HOSP IP/OBS HIGH 50: CPT

## 2017-08-05 RX ORDER — INSULIN GLARGINE 100 [IU]/ML
18 INJECTION, SOLUTION SUBCUTANEOUS AT BEDTIME
Qty: 0 | Refills: 0 | Status: DISCONTINUED | OUTPATIENT
Start: 2017-08-05 | End: 2017-08-06

## 2017-08-05 RX ORDER — LISINOPRIL 2.5 MG/1
5 TABLET ORAL DAILY
Qty: 0 | Refills: 0 | Status: DISCONTINUED | OUTPATIENT
Start: 2017-08-05 | End: 2017-08-07

## 2017-08-05 RX ADMIN — SODIUM CHLORIDE 3 MILLILITER(S): 9 INJECTION INTRAMUSCULAR; INTRAVENOUS; SUBCUTANEOUS at 13:28

## 2017-08-05 RX ADMIN — SODIUM CHLORIDE 3 MILLILITER(S): 9 INJECTION INTRAMUSCULAR; INTRAVENOUS; SUBCUTANEOUS at 05:08

## 2017-08-05 RX ADMIN — AMLODIPINE BESYLATE 5 MILLIGRAM(S): 2.5 TABLET ORAL at 05:06

## 2017-08-05 RX ADMIN — HEPARIN SODIUM 5000 UNIT(S): 5000 INJECTION INTRAVENOUS; SUBCUTANEOUS at 17:27

## 2017-08-05 RX ADMIN — SODIUM CHLORIDE 3 MILLILITER(S): 9 INJECTION INTRAMUSCULAR; INTRAVENOUS; SUBCUTANEOUS at 22:32

## 2017-08-05 RX ADMIN — LISINOPRIL 2.5 MILLIGRAM(S): 2.5 TABLET ORAL at 05:06

## 2017-08-05 RX ADMIN — ATORVASTATIN CALCIUM 20 MILLIGRAM(S): 80 TABLET, FILM COATED ORAL at 22:31

## 2017-08-05 RX ADMIN — Medication 8: at 16:38

## 2017-08-05 RX ADMIN — INSULIN GLARGINE 18 UNIT(S): 100 INJECTION, SOLUTION SUBCUTANEOUS at 22:31

## 2017-08-05 RX ADMIN — Medication 4: at 08:46

## 2017-08-05 RX ADMIN — HEPARIN SODIUM 5000 UNIT(S): 5000 INJECTION INTRAVENOUS; SUBCUTANEOUS at 05:06

## 2017-08-05 RX ADMIN — Medication 12: at 12:23

## 2017-08-05 NOTE — PROGRESS NOTE ADULT - PROBLEM SELECTOR PLAN 3
Left upper lobe/suprahilar central lung mass (3.6 x 3.4 x 3.7 cm) with   nodular endobronchial component (1 cm) noted within the left upper lobe   bronchus.Additional nodules within the right lower lobe measuring 2 mm and 5 mm.    denies cough or smoking h/o or night sweat. admit to weight loss and decreased appetite    Pulmonary eval appreciated, Bronchoscopy done yesterday : f/u results and culture Left upper lobe/suprahilar central lung mass (3.6 x 3.4 x 3.7 cm) with   nodular endobronchial component (1 cm) noted within the left upper lobe   bronchus.Additional nodules within the right lower lobe measuring 2 mm and 5 mm.    denies cough or smoking h/o or night sweat. admit to weight loss and decreased appetite    Pulmonary eval appreciated, Bronchoscopy done yesterday : f/u results and culture  Hold ABX.

## 2017-08-05 NOTE — PROGRESS NOTE ADULT - PROBLEM SELECTOR PLAN 1
new onset DM. not in DKA as AG at 17 per [facility] labs.   a1c elevated, c/w insulin   increased mealtime insulin and increased lantus to 15 units/day. finger sticks still high .    Dm education. Nutrition education.   lipitor for HLD, monitoring for side effects new onset DM. not in DKA as AG at 17 per [facility] labs.   a1c elevated, c/w insulin   increased mealtime insulin and increased lantus to 18 units/day.     Dm education. Nutrition education.   lipitor for HLD, monitoring for side effects

## 2017-08-05 NOTE — PROGRESS NOTE ADULT - SUBJECTIVE AND OBJECTIVE BOX
INTERVAL HPI/OVERNIGHT EVENTS:  feels well. s/p bronch with bx, brushings, and BAL.    Vital Signs Last 24 Hrs  T(C): 36.7 (05 Aug 2017 16:39), Max: 37.1 (05 Aug 2017 02:52)  T(F): 98 (05 Aug 2017 16:39), Max: 98.8 (05 Aug 2017 02:52)  HR: 76 (05 Aug 2017 16:39) (76 - 103)  BP: 141/82 (05 Aug 2017 16:39) (108/64 - 153/92)  BP(mean): --  RR: 16 (05 Aug 2017 16:39) (12 - 18)  SpO2: 99% (05 Aug 2017 16:39) (96% - 99%)        PHYSICAL EXAM:  GEN:         Awake, responsive and comfortable.  HEENT:    Normal.    RESP:   CVS:             Regular rate and rhythm.   ABD:         Soft, non-tender, non-distended;   :             No costovertebral angle tenderness  EXTR:            No clubbing, cyanosis or edema  CNS:              Intact sensory and motor function.        MEDICATIONS  (STANDING):  dextrose 5%. 1000 milliLiter(s) (50 mL/Hr) IV Continuous <Continuous>  insulin lispro (HumaLOG) corrective regimen sliding scale   SubCutaneous three times a day before meals  dextrose 5%. 1000 milliLiter(s) (50 mL/Hr) IV Continuous <Continuous>  dextrose 50% Injectable 12.5 Gram(s) IV Push once  dextrose 50% Injectable 25 Gram(s) IV Push once  dextrose 50% Injectable 25 Gram(s) IV Push once  dextrose 50% Injectable 12.5 Gram(s) IV Push once  dextrose 50% Injectable 25 Gram(s) IV Push once  dextrose 50% Injectable 25 Gram(s) IV Push once  dextrose 5%. 1000 milliLiter(s) (50 mL/Hr) IV Continuous <Continuous>  amLODIPine   Tablet 5 milliGRAM(s) Oral daily  heparin  Injectable 5000 Unit(s) SubCutaneous every 12 hours  sodium chloride 0.9% lock flush 3 milliLiter(s) IV Push every 8 hours  atorvastatin 20 milliGRAM(s) Oral at bedtime  lisinopril 5 milliGRAM(s) Oral daily  insulin glargine Injectable (LANTUS) 18 Unit(s) SubCutaneous at bedtime    MEDICATIONS  (PRN):  dextrose Gel 1 Dose(s) Oral once PRN Blood Glucose LESS THAN 70 milliGRAM(s)/deciliter  dextrose Gel 1 Dose(s) Oral once PRN Blood Glucose LESS THAN 70 milliGRAM(s)/deciliter  glucagon  Injectable 1 milliGRAM(s) IntraMuscular once PRN Glucose LESS THAN 70 milligrams/deciliter        LABS:                        15.0   9.2   )-----------( 178      ( 05 Aug 2017 06:52 )             45.1     08-05    139  |  102  |  10  ----------------------------<  269<H>  3.8   |  25  |  0.74    Ca    8.8      05 Aug 2017 06:52      PT/INR - ( 04 Aug 2017 07:58 )   PT: 12.5 sec;   INR: 1.14 ratio         PTT - ( 04 Aug 2017 07:58 )  PTT:30.0 sec          RADIOLOGY & ADDITIONAL STUDIES: post procedure cxr stable.    ASSESSMENT AND PLAN:c&s and afb neg so far. path/cytology pending.

## 2017-08-05 NOTE — PROGRESS NOTE ADULT - PROBLEM SELECTOR PLAN 2
US done kidney no hydronephrosis  improved after hydration

## 2017-08-05 NOTE — PROGRESS NOTE ADULT - SUBJECTIVE AND OBJECTIVE BOX
Patient is a 67y old  Male who presents with a chief complaint of poor apptiite (01 Aug 2017 01:15)      Interval History: decreasing glucose toxicity   finger sticks are in low 200's , on Lantus 18 units and Lispro sliding scale coverage with meals   PO fair         MEDICATIONS  (STANDING):  dextrose 5%. 1000 milliLiter(s) (50 mL/Hr) IV Continuous <Continuous>  insulin lispro (HumaLOG) corrective regimen sliding scale   SubCutaneous three times a day before meals  dextrose 5%. 1000 milliLiter(s) (50 mL/Hr) IV Continuous <Continuous>  dextrose 50% Injectable 12.5 Gram(s) IV Push once  dextrose 50% Injectable 25 Gram(s) IV Push once  dextrose 50% Injectable 25 Gram(s) IV Push once  dextrose 50% Injectable 12.5 Gram(s) IV Push once  dextrose 50% Injectable 25 Gram(s) IV Push once  dextrose 50% Injectable 25 Gram(s) IV Push once  dextrose 5%. 1000 milliLiter(s) (50 mL/Hr) IV Continuous <Continuous>  amLODIPine   Tablet 5 milliGRAM(s) Oral daily  heparin  Injectable 5000 Unit(s) SubCutaneous every 12 hours  sodium chloride 0.9% lock flush 3 milliLiter(s) IV Push every 8 hours  atorvastatin 20 milliGRAM(s) Oral at bedtime  lisinopril 5 milliGRAM(s) Oral daily  insulin glargine Injectable (LANTUS) 18 Unit(s) SubCutaneous at bedtime    MEDICATIONS  (PRN):  dextrose Gel 1 Dose(s) Oral once PRN Blood Glucose LESS THAN 70 milliGRAM(s)/deciliter  dextrose Gel 1 Dose(s) Oral once PRN Blood Glucose LESS THAN 70 milliGRAM(s)/deciliter  glucagon  Injectable 1 milliGRAM(s) IntraMuscular once PRN Glucose LESS THAN 70 milligrams/deciliter      Allergies    No Known Allergies    Intolerances        REVIEW OF SYSTEMS:  CONSTITUTIONAL: lethargic   poorly nourished   no obvoius nausea , vomiting, hemoptysis, hematemesis, hematochezia or hematuria     Vital Signs Last 24 Hrs  T(C): 36.7 (05 Aug 2017 11:06), Max: 37.1 (05 Aug 2017 02:52)  T(F): 98 (05 Aug 2017 11:06), Max: 98.8 (05 Aug 2017 02:52)  HR: 103 (05 Aug 2017 11:06) (75 - 103)  BP: 108/64 (05 Aug 2017 11:06) (108/64 - 153/92)  BP(mean): --  RR: 18 (05 Aug 2017 11:06) (11 - 18)  SpO2: 98% (05 Aug 2017 11:06) (93% - 98%)    PHYSICAL EXAM:  GENERAL: deferred     LABS:    PT/INR - ( 04 Aug 2017 07:58 )   PT: 12.5 sec;   INR: 1.14 ratio         PTT - ( 04 Aug 2017 07:58 )  PTT:30.0 sec    CAPILLARY BLOOD GLUCOSE  231 (05 Aug 2017 08:41)  229 (04 Aug 2017 21:18)  211 (04 Aug 2017 16:15)        Lipid panel:           Thyroid:  Diabetes Tests:  Parathyroid Panel:  Adrenals:  RADIOLOGY & ADDITIONAL TESTS:    Imaging Personally Reviewed:  [ ] YES  [ ] NO    Consultant(s) Notes Reviewed:  [ ] YES  [ ] NO    Care Discussed with Consultants/Other Providers [ ] YES  [ ] NO

## 2017-08-05 NOTE — PROGRESS NOTE ADULT - SUBJECTIVE AND OBJECTIVE BOX
CC/F/U for: hyperkalemia now resolved and eval of lung mass    INTERVAL HPI/OVERNIGHT EVENTS:  Pt seen and examined at bedside.     Allergies/Intolerance: No Known Allergies      MEDICATIONS  (STANDING):  dextrose 5%. 1000 milliLiter(s) (50 mL/Hr) IV Continuous <Continuous>  insulin lispro (HumaLOG) corrective regimen sliding scale   SubCutaneous three times a day before meals  dextrose 5%. 1000 milliLiter(s) (50 mL/Hr) IV Continuous <Continuous>  dextrose 50% Injectable 12.5 Gram(s) IV Push once  dextrose 50% Injectable 25 Gram(s) IV Push once  dextrose 50% Injectable 25 Gram(s) IV Push once  dextrose 50% Injectable 12.5 Gram(s) IV Push once  dextrose 50% Injectable 25 Gram(s) IV Push once  dextrose 50% Injectable 25 Gram(s) IV Push once  dextrose 5%. 1000 milliLiter(s) (50 mL/Hr) IV Continuous <Continuous>  amLODIPine   Tablet 5 milliGRAM(s) Oral daily  heparin  Injectable 5000 Unit(s) SubCutaneous every 12 hours  sodium chloride 0.9% lock flush 3 milliLiter(s) IV Push every 8 hours  atorvastatin 20 milliGRAM(s) Oral at bedtime  lisinopril 5 milliGRAM(s) Oral daily  insulin glargine Injectable (LANTUS) 18 Unit(s) SubCutaneous at bedtime    MEDICATIONS  (PRN):  dextrose Gel 1 Dose(s) Oral once PRN Blood Glucose LESS THAN 70 milliGRAM(s)/deciliter  dextrose Gel 1 Dose(s) Oral once PRN Blood Glucose LESS THAN 70 milliGRAM(s)/deciliter  glucagon  Injectable 1 milliGRAM(s) IntraMuscular once PRN Glucose LESS THAN 70 milligrams/deciliter        ROS: all systems reviewed and wnl      PHYSICAL EXAMINATION:  Vital Signs Last 24 Hrs  T(C): 36.7 (05 Aug 2017 11:06), Max: 37.1 (05 Aug 2017 02:52)  T(F): 98 (05 Aug 2017 11:06), Max: 98.8 (05 Aug 2017 02:52)  HR: 103 (05 Aug 2017 11:06) (75 - 103)  BP: 108/64 (05 Aug 2017 11:06) (108/64 - 153/92)  BP(mean): --  RR: 18 (05 Aug 2017 11:06) (11 - 18)  SpO2: 98% (05 Aug 2017 11:06) (93% - 98%)  CAPILLARY BLOOD GLUCOSE  231 (05 Aug 2017 08:41)  229 (04 Aug 2017 21:18)  211 (04 Aug 2017 16:15)          08-04 @ 07:01  -  08-05 @ 07:00  --------------------------------------------------------  IN: 75 mL / OUT: 0 mL / NET: 75 mL    08-05 @ 07:01  -  08-05 @ 13:44  --------------------------------------------------------  IN: 400 mL / OUT: 0 mL / NET: 400 mL        GENERAL:   HEAD:    EYES:   ENMT:   NECK: supple, No JVD  CHEST/LUNG: clear to auscultation bilaterally; no rales, rhonchi, or wheezing b/l  HEART: normal S1, S2  ABDOMEN: BS+, soft, ND, NT   EXTREMITIES:  pulses palpable; no clubbing, cyanosis, or edema b/l LEs  NEURO: awake, alert, interactive; moves all extremities  SKIN: no rashes or lesions      LABS:                        15.0   9.2   )-----------( 178      ( 05 Aug 2017 06:52 )             45.1     08-05    139  |  102  |  10  ----------------------------<  269<H>  3.8   |  25  |  0.74    Ca    8.8      05 Aug 2017 06:52      PT/INR - ( 04 Aug 2017 07:58 )   PT: 12.5 sec;   INR: 1.14 ratio         PTT - ( 04 Aug 2017 07:58 )  PTT:30.0 sec        RADIOLOGY & ADDITIONAL TESTS:      ASSESSMENT AND PLAN: CC/F/U for: hyperkalemia now resolved and eval of lung mass and new onset DM.     INTERVAL HPI/OVERNIGHT EVENTS:  Pt seen and examined at bedside.     Allergies/Intolerance: No Known Allergies      MEDICATIONS  (STANDING):  dextrose 5%. 1000 milliLiter(s) (50 mL/Hr) IV Continuous <Continuous>  insulin lispro (HumaLOG) corrective regimen sliding scale   SubCutaneous three times a day before meals  dextrose 5%. 1000 milliLiter(s) (50 mL/Hr) IV Continuous <Continuous>  dextrose 50% Injectable 12.5 Gram(s) IV Push once  dextrose 50% Injectable 25 Gram(s) IV Push once  dextrose 50% Injectable 25 Gram(s) IV Push once  dextrose 50% Injectable 12.5 Gram(s) IV Push once  dextrose 50% Injectable 25 Gram(s) IV Push once  dextrose 50% Injectable 25 Gram(s) IV Push once  dextrose 5%. 1000 milliLiter(s) (50 mL/Hr) IV Continuous <Continuous>  amLODIPine   Tablet 5 milliGRAM(s) Oral daily  heparin  Injectable 5000 Unit(s) SubCutaneous every 12 hours  sodium chloride 0.9% lock flush 3 milliLiter(s) IV Push every 8 hours  atorvastatin 20 milliGRAM(s) Oral at bedtime  lisinopril 5 milliGRAM(s) Oral daily  insulin glargine Injectable (LANTUS) 18 Unit(s) SubCutaneous at bedtime    MEDICATIONS  (PRN):  dextrose Gel 1 Dose(s) Oral once PRN Blood Glucose LESS THAN 70 milliGRAM(s)/deciliter  dextrose Gel 1 Dose(s) Oral once PRN Blood Glucose LESS THAN 70 milliGRAM(s)/deciliter  glucagon  Injectable 1 milliGRAM(s) IntraMuscular once PRN Glucose LESS THAN 70 milligrams/deciliter        ROS: all systems reviewed and wnl      PHYSICAL EXAMINATION:  Vital Signs Last 24 Hrs  T(C): 36.7 (05 Aug 2017 11:06), Max: 37.1 (05 Aug 2017 02:52)  T(F): 98 (05 Aug 2017 11:06), Max: 98.8 (05 Aug 2017 02:52)  HR: 103 (05 Aug 2017 11:06) (75 - 103)  BP: 108/64 (05 Aug 2017 11:06) (108/64 - 153/92)  BP(mean): --  RR: 18 (05 Aug 2017 11:06) (11 - 18)  SpO2: 98% (05 Aug 2017 11:06) (93% - 98%)  CAPILLARY BLOOD GLUCOSE  231 (05 Aug 2017 08:41)  229 (04 Aug 2017 21:18)  211 (04 Aug 2017 16:15)          08-04 @ 07:01  -  08-05 @ 07:00  --------------------------------------------------------  IN: 75 mL / OUT: 0 mL / NET: 75 mL    08-05 @ 07:01  -  08-05 @ 13:44  --------------------------------------------------------  IN: 400 mL / OUT: 0 mL / NET: 400 mL        GENERAL: stable, NAD. No cough, CP, SOB.   HEAD:    EYES:   ENMT:   NECK: supple, No JVD  CHEST/LUNG: clear to auscultation bilaterally; no rales, rhonchi, or wheezing b/l  HEART: normal S1, S2  ABDOMEN: BS+, soft, ND, NT   EXTREMITIES:  pulses palpable; no clubbing, cyanosis, or edema b/l LEs  NEURO: awake, alert, interactive; moves all extremities  SKIN: no rashes or lesions      LABS:                        15.0   9.2   )-----------( 178      ( 05 Aug 2017 06:52 )             45.1     08-05    139  |  102  |  10  ----------------------------<  269<H>  3.8   |  25  |  0.74    Ca    8.8      05 Aug 2017 06:52      PT/INR - ( 04 Aug 2017 07:58 )   PT: 12.5 sec;   INR: 1.14 ratio         PTT - ( 04 Aug 2017 07:58 )  PTT:30.0 sec        RADIOLOGY & ADDITIONAL TESTS:      ASSESSMENT AND PLAN:

## 2017-08-06 PROCEDURE — 99233 SBSQ HOSP IP/OBS HIGH 50: CPT

## 2017-08-06 RX ORDER — INSULIN LISPRO 100/ML
4 VIAL (ML) SUBCUTANEOUS
Qty: 0 | Refills: 0 | Status: DISCONTINUED | OUTPATIENT
Start: 2017-08-06 | End: 2017-08-07

## 2017-08-06 RX ADMIN — Medication 4: at 08:34

## 2017-08-06 RX ADMIN — SODIUM CHLORIDE 3 MILLILITER(S): 9 INJECTION INTRAMUSCULAR; INTRAVENOUS; SUBCUTANEOUS at 13:47

## 2017-08-06 RX ADMIN — Medication 10: at 16:41

## 2017-08-06 RX ADMIN — HEPARIN SODIUM 5000 UNIT(S): 5000 INJECTION INTRAVENOUS; SUBCUTANEOUS at 05:26

## 2017-08-06 RX ADMIN — HEPARIN SODIUM 5000 UNIT(S): 5000 INJECTION INTRAVENOUS; SUBCUTANEOUS at 17:59

## 2017-08-06 RX ADMIN — Medication 4 UNIT(S): at 16:40

## 2017-08-06 RX ADMIN — Medication 10: at 11:42

## 2017-08-06 RX ADMIN — AMLODIPINE BESYLATE 5 MILLIGRAM(S): 2.5 TABLET ORAL at 05:26

## 2017-08-06 RX ADMIN — LISINOPRIL 5 MILLIGRAM(S): 2.5 TABLET ORAL at 05:26

## 2017-08-06 RX ADMIN — ATORVASTATIN CALCIUM 20 MILLIGRAM(S): 80 TABLET, FILM COATED ORAL at 21:35

## 2017-08-06 RX ADMIN — SODIUM CHLORIDE 3 MILLILITER(S): 9 INJECTION INTRAMUSCULAR; INTRAVENOUS; SUBCUTANEOUS at 05:25

## 2017-08-06 RX ADMIN — SODIUM CHLORIDE 3 MILLILITER(S): 9 INJECTION INTRAMUSCULAR; INTRAVENOUS; SUBCUTANEOUS at 21:33

## 2017-08-06 NOTE — PROGRESS NOTE ADULT - PROBLEM SELECTOR PLAN 9
clinically no abd pain .    monitor pt clinically, trending down

## 2017-08-06 NOTE — PROGRESS NOTE ADULT - ASSESSMENT
66 yo m no reported pmhx, presenting with diminished appetite since last weeks. However he states he saw a doctor in [place] approx 3 months ago at which time was instructed to follow up. prior to that doctor visit in [place] patient has not seen a physician in a number of years.  He was visiting his elderly mom who had not been feeling well and attributed  his decreased appetite to this event. Nevertheless he has since returned to [facility] one month ago and appetite continues to deteriorate. He only drinks water and juices and expressed frequent urination. Also c/o weight loss but unable to quantify.  Glucose 615 on presentation, also found to have L lung mass.
66 yo m no reported pmhx, presenting with diminished appetite since last weeks. However he states he saw a doctor in [place] approx 3 months ago at which time was instructed to follow up. prior to that doctor visit in [place] patient has not seen a physician in a number of years.  He was visiting his elderly mom who had not been feeling well and attributed  his decreased appetite to this event. Nevertheless he has since returned to [facility] one month ago and appetite continues to deteriorate. He only drinks water and juices and expressed frequent urination. Also c/o weight loss but unable to quantify.  Glucose 615 on presentation, also found to have L lung mass.
66 yo m no reported pmhx, presenting with diminished appetite since last weeks. However he states he saw a doctor in [place] approx 3 months ago at which time was instructed to follow up. prior to that doctor visit in [place] patient has not seen a physician in a number of years.  He was visiting his elderly mom who had not been feeling well and attributed  his decreased appetite to this event. Nevertheless he has since returned to [facility] one month ago and appetite continues to deteriorate. He only drinks water and juices and expressed frequent urination. Also c/o weight loss but unable to quantify.  Glucose 615 on presentation, also found to have L lung mass.
66 yo m no reported pmhx, presenting with diminished appetite since last weeks. However he states he saw a doctor in [place] approx 3 months ago at which time was instructed to follow up. prior to that doctor visit in [place] patient has not seen a physician in a number of years.  He was visiting his elderly mom who had not been feeling well and attributed  his decreased appetite to this event. Nevertheless he has since returned to [facility] one month ago and appetite continues to deteriorate. He only drinks water and juices and expressed frequent urination. Also c/o weight loss but unable to quantify.  Glucose 615 on presentation, also found to have L lung mass.
68 yo m no reported pmhx, presenting with diminished appetite since last weeks. However he states he saw a doctor in [place] approx 3 months ago at which time was instructed to follow up. prior to that doctor visit in [place] patient has not seen a physician in a number of years.  He was visiting his elderly mom who had not been feeling well and attributed  his decreased appetite to this event. Nevertheless he has since returned to [facility] one month ago and appetite continues to deteriorate. He only drinks water and juices and expressed frequent urination. Also c/o weight loss but unable to quantify.  Glucose 615 on presentation, also found to have L lung mass.
diabetes
68 yo m no reported pmhx, presenting with diminished appetite since last weeks. However he states he saw a doctor in [place] approx 3 months ago at which time was instructed to follow up. prior to that doctor visit in [place] patient has not seen a physician in a number of years.  He was visiting his elderly mom who had not been feeling well and attributed  his decreased appetite to this event. Nevertheless he has since returned to [facility] one month ago and appetite continues to deteriorate. He only drinks water and juices and expressed frequent urination. Also c/o weight loss but unable to quantify.  Glucose 615 on presentation, also found to have L lung mass.

## 2017-08-06 NOTE — PROGRESS NOTE ADULT - PROBLEM SELECTOR PROBLEM 6
Elevated blood pressure reading
Hypercalcemia
Elevated blood pressure reading

## 2017-08-06 NOTE — PROGRESS NOTE ADULT - PROBLEM SELECTOR PLAN 4
Arterial u/s shows occlusion, started on asa,   discussed plan with vascular, patient can f/u as outpatient
heparin sc
Arterial u/s shows occlusion, started on asa,   discussed plan with vascular, patient can f/u as outpatient
Arterial u/s shows occlusion, started on asa, vascular to see patient

## 2017-08-06 NOTE — PROGRESS NOTE ADULT - PROBLEM SELECTOR PROBLEM 2
LOULOU (acute kidney injury)

## 2017-08-06 NOTE — PROGRESS NOTE ADULT - PROBLEM SELECTOR PLAN 3
Left upper lobe/suprahilar central lung mass (3.6 x 3.4 x 3.7 cm) with   nodular endobronchial component (1 cm) noted within the left upper lobe   bronchus.Additional nodules within the right lower lobe measuring 2 mm and 5 mm.    denies cough or smoking h/o or night sweat. admit to weight loss and decreased appetite    Pulmonary eval appreciated, Bronchoscopy done yesterday : f/u results and culture  Hold ABX. Await pathology results.

## 2017-08-06 NOTE — PROGRESS NOTE ADULT - PROBLEM SELECTOR PLAN 1
new onset DM. not in DKA as AG at 17 per [facility] labs.   a1c elevated, c/w insulin   increased mealtime insulin and increased lantus to 25 units/day.  Add Humolog 4 units TID with each meal.  Dm education. Nutrition education.   lipitor for HLD, monitoring for side effects

## 2017-08-06 NOTE — PROGRESS NOTE ADULT - PROBLEM SELECTOR PROBLEM 5
Elevated blood pressure reading
Preventive measure

## 2017-08-06 NOTE — PROGRESS NOTE ADULT - PROVIDER SPECIALTY LIST ADULT
Endocrinology
Hospitalist
Pulmonology
Pulmonology
Vascular Surgery
Hospitalist

## 2017-08-06 NOTE — PROGRESS NOTE ADULT - PROBLEM SELECTOR PLAN 6
c/w ace-i. Blood pressure is improving. Increase Zestril to 5 mg/day.
resolving with ivf, will monitor
c/w ace-i  pressure is improving
c/w ace-i  pressure is improving
c/w ace-i. Blood pressure is improving. Increase Zestril to 5 mg/day.
start on ace

## 2017-08-06 NOTE — PROGRESS NOTE ADULT - PROBLEM SELECTOR PLAN 7
resolving with ivf, will monitor
will monitor patient, d/c fluids
resolving with ivf, will monitor

## 2017-08-06 NOTE — PROGRESS NOTE ADULT - SUBJECTIVE AND OBJECTIVE BOX
CC/F/U for: hyperglycemia and lung mass eval.     INTERVAL HPI/OVERNIGHT EVENTS:  Pt seen and examined at bedside.     Allergies/Intolerance: No Known Allergies      MEDICATIONS  (STANDING):  dextrose 5%. 1000 milliLiter(s) (50 mL/Hr) IV Continuous <Continuous>  insulin lispro (HumaLOG) corrective regimen sliding scale   SubCutaneous three times a day before meals  dextrose 5%. 1000 milliLiter(s) (50 mL/Hr) IV Continuous <Continuous>  dextrose 50% Injectable 12.5 Gram(s) IV Push once  dextrose 50% Injectable 25 Gram(s) IV Push once  dextrose 50% Injectable 25 Gram(s) IV Push once  dextrose 50% Injectable 12.5 Gram(s) IV Push once  dextrose 50% Injectable 25 Gram(s) IV Push once  dextrose 50% Injectable 25 Gram(s) IV Push once  dextrose 5%. 1000 milliLiter(s) (50 mL/Hr) IV Continuous <Continuous>  amLODIPine   Tablet 5 milliGRAM(s) Oral daily  heparin  Injectable 5000 Unit(s) SubCutaneous every 12 hours  sodium chloride 0.9% lock flush 3 milliLiter(s) IV Push every 8 hours  atorvastatin 20 milliGRAM(s) Oral at bedtime  lisinopril 5 milliGRAM(s) Oral daily  insulin lispro Injectable (HumaLOG) 4 Unit(s) SubCutaneous three times a day before meals    MEDICATIONS  (PRN):  dextrose Gel 1 Dose(s) Oral once PRN Blood Glucose LESS THAN 70 milliGRAM(s)/deciliter  dextrose Gel 1 Dose(s) Oral once PRN Blood Glucose LESS THAN 70 milliGRAM(s)/deciliter  glucagon  Injectable 1 milliGRAM(s) IntraMuscular once PRN Glucose LESS THAN 70 milligrams/deciliter        ROS: all systems reviewed and wnl      PHYSICAL EXAMINATION:  Vital Signs Last 24 Hrs  T(C): 36.7 (06 Aug 2017 10:41), Max: 36.7 (05 Aug 2017 16:39)  T(F): 98.1 (06 Aug 2017 10:41), Max: 98.1 (06 Aug 2017 10:41)  HR: 72 (06 Aug 2017 10:41) (69 - 76)  BP: 126/73 (06 Aug 2017 10:41) (125/78 - 141/83)  BP(mean): --  RR: 16 (06 Aug 2017 10:41) (16 - 18)  SpO2: 100% (06 Aug 2017 10:41) (98% - 100%)  CAPILLARY BLOOD GLUCOSE  396 (06 Aug 2017 11:39)  237 (06 Aug 2017 08:27)  361 (05 Aug 2017 22:30)  343 (05 Aug 2017 16:36)          08-05 @ 07:01  -  08-06 @ 07:00  --------------------------------------------------------  IN: 640 mL / OUT: 0 mL / NET: 640 mL    08-06 @ 07:01  -  08-06 @ 12:46  --------------------------------------------------------  IN: 360 mL / OUT: 0 mL / NET: 360 mL        GENERAL:   HEAD:    EYES:   ENMT:   NECK: supple, No JVD  CHEST/LUNG: clear to auscultation bilaterally; no rales, rhonchi, or wheezing b/l  HEART: normal S1, S2  ABDOMEN: BS+, soft, ND, NT   EXTREMITIES:  pulses palpable; no clubbing, cyanosis, or edema b/l LEs  NEURO: awake, alert, interactive; moves all extremities  SKIN: no rashes or lesions      LABS:                        15.0   9.2   )-----------( 178      ( 05 Aug 2017 06:52 )             45.1     08-05    139  |  102  |  10  ----------------------------<  269<H>  3.8   |  25  |  0.74    Ca    8.8      05 Aug 2017 06:52              RADIOLOGY & ADDITIONAL TESTS:      ASSESSMENT AND PLAN: CC/F/U for: hyperglycemia and lung mass eval.     INTERVAL HPI/OVERNIGHT EVENTS:  Pt seen and examined at bedside.     Allergies/Intolerance: No Known Allergies      MEDICATIONS  (STANDING):  dextrose 5%. 1000 milliLiter(s) (50 mL/Hr) IV Continuous <Continuous>  insulin lispro (HumaLOG) corrective regimen sliding scale   SubCutaneous three times a day before meals  dextrose 5%. 1000 milliLiter(s) (50 mL/Hr) IV Continuous <Continuous>  dextrose 50% Injectable 12.5 Gram(s) IV Push once  dextrose 50% Injectable 25 Gram(s) IV Push once  dextrose 50% Injectable 25 Gram(s) IV Push once  dextrose 50% Injectable 12.5 Gram(s) IV Push once  dextrose 50% Injectable 25 Gram(s) IV Push once  dextrose 50% Injectable 25 Gram(s) IV Push once  dextrose 5%. 1000 milliLiter(s) (50 mL/Hr) IV Continuous <Continuous>  amLODIPine   Tablet 5 milliGRAM(s) Oral daily  heparin  Injectable 5000 Unit(s) SubCutaneous every 12 hours  sodium chloride 0.9% lock flush 3 milliLiter(s) IV Push every 8 hours  atorvastatin 20 milliGRAM(s) Oral at bedtime  lisinopril 5 milliGRAM(s) Oral daily  insulin lispro Injectable (HumaLOG) 4 Unit(s) SubCutaneous three times a day before meals    MEDICATIONS  (PRN):  dextrose Gel 1 Dose(s) Oral once PRN Blood Glucose LESS THAN 70 milliGRAM(s)/deciliter  dextrose Gel 1 Dose(s) Oral once PRN Blood Glucose LESS THAN 70 milliGRAM(s)/deciliter  glucagon  Injectable 1 milliGRAM(s) IntraMuscular once PRN Glucose LESS THAN 70 milligrams/deciliter        ROS: all systems reviewed and wnl      PHYSICAL EXAMINATION:  Vital Signs Last 24 Hrs  T(C): 36.7 (06 Aug 2017 10:41), Max: 36.7 (05 Aug 2017 16:39)  T(F): 98.1 (06 Aug 2017 10:41), Max: 98.1 (06 Aug 2017 10:41)  HR: 72 (06 Aug 2017 10:41) (69 - 76)  BP: 126/73 (06 Aug 2017 10:41) (125/78 - 141/83)  BP(mean): --  RR: 16 (06 Aug 2017 10:41) (16 - 18)  SpO2: 100% (06 Aug 2017 10:41) (98% - 100%)  CAPILLARY BLOOD GLUCOSE  396 (06 Aug 2017 11:39)  237 (06 Aug 2017 08:27)  361 (05 Aug 2017 22:30)  343 (05 Aug 2017 16:36)          08-05 @ 07:01  -  08-06 @ 07:00  --------------------------------------------------------  IN: 640 mL / OUT: 0 mL / NET: 640 mL    08-06 @ 07:01  -  08-06 @ 12:46  --------------------------------------------------------  IN: 360 mL / OUT: 0 mL / NET: 360 mL        GENERAL: stable in bed. NAD, comfortable. no cough or hemoptysis.   NECK: supple, No JVD  CHEST/LUNG: clear to auscultation bilaterally; no rales, rhonchi, or wheezing b/l  HEART: normal S1, S2  ABDOMEN: BS+, soft, ND, NT   EXTREMITIES:  pulses palpable; no clubbing, cyanosis, or edema b/l LEs  NEURO: awake, alert, interactive; moves all extremities  SKIN: no rashes or lesions      LABS:                        15.0   9.2   )-----------( 178      ( 05 Aug 2017 06:52 )             45.1     08-05    139  |  102  |  10  ----------------------------<  269<H>  3.8   |  25  |  0.74    Ca    8.8      05 Aug 2017 06:52              RADIOLOGY & ADDITIONAL TESTS:      ASSESSMENT AND PLAN:

## 2017-08-07 ENCOUNTER — TRANSCRIPTION ENCOUNTER (OUTPATIENT)
Age: 67
End: 2017-08-07

## 2017-08-07 ENCOUNTER — RESULT REVIEW (OUTPATIENT)
Age: 67
End: 2017-08-07

## 2017-08-07 VITALS
OXYGEN SATURATION: 98 % | DIASTOLIC BLOOD PRESSURE: 85 MMHG | RESPIRATION RATE: 17 BRPM | TEMPERATURE: 99 F | SYSTOLIC BLOOD PRESSURE: 141 MMHG | HEART RATE: 74 BPM

## 2017-08-07 PROBLEM — Z00.00 ENCOUNTER FOR PREVENTIVE HEALTH EXAMINATION: Status: ACTIVE | Noted: 2017-08-07

## 2017-08-07 LAB
-  AMPICILLIN/SULBACTAM: SIGNIFICANT CHANGE UP
-  CEFAZOLIN: SIGNIFICANT CHANGE UP
-  CIPROFLOXACIN: SIGNIFICANT CHANGE UP
-  CLINDAMYCIN: SIGNIFICANT CHANGE UP
-  ERYTHROMYCIN: SIGNIFICANT CHANGE UP
-  GENTAMICIN: SIGNIFICANT CHANGE UP
-  LEVOFLOXACIN: SIGNIFICANT CHANGE UP
-  MOXIFLOXACIN(AEROBIC): SIGNIFICANT CHANGE UP
-  OXACILLIN: SIGNIFICANT CHANGE UP
-  PENICILLIN: SIGNIFICANT CHANGE UP
-  RIFAMPIN: SIGNIFICANT CHANGE UP
-  TETRACYCLINE: SIGNIFICANT CHANGE UP
-  TRIMETHOPRIM/SULFAMETHOXAZOLE: SIGNIFICANT CHANGE UP
-  VANCOMYCIN: SIGNIFICANT CHANGE UP
METHOD TYPE: SIGNIFICANT CHANGE UP

## 2017-08-07 PROCEDURE — 99239 HOSP IP/OBS DSCHRG MGMT >30: CPT

## 2017-08-07 PROCEDURE — 88112 CYTOPATH CELL ENHANCE TECH: CPT | Mod: 26

## 2017-08-07 RX ORDER — ISOPROPYL ALCOHOL, BENZOCAINE .7; .06 ML/ML; ML/ML
1 SWAB TOPICAL
Qty: 1 | Refills: 0 | OUTPATIENT
Start: 2017-08-07 | End: 2017-09-06

## 2017-08-07 RX ORDER — ATORVASTATIN CALCIUM 80 MG/1
1 TABLET, FILM COATED ORAL
Qty: 30 | Refills: 0 | OUTPATIENT
Start: 2017-08-07 | End: 2017-09-06

## 2017-08-07 RX ORDER — INSULIN GLARGINE 100 [IU]/ML
18 INJECTION, SOLUTION SUBCUTANEOUS AT BEDTIME
Qty: 0 | Refills: 0 | Status: DISCONTINUED | OUTPATIENT
Start: 2017-08-07 | End: 2017-08-07

## 2017-08-07 RX ORDER — INSULIN GLARGINE 100 [IU]/ML
18 INJECTION, SOLUTION SUBCUTANEOUS
Qty: 1 | Refills: 0 | OUTPATIENT
Start: 2017-08-07 | End: 2017-09-06

## 2017-08-07 RX ORDER — INSULIN LISPRO 100/ML
4 VIAL (ML) SUBCUTANEOUS
Qty: 1 | Refills: 0 | OUTPATIENT
Start: 2017-08-07 | End: 2017-09-06

## 2017-08-07 RX ORDER — LISINOPRIL 2.5 MG/1
1 TABLET ORAL
Qty: 30 | Refills: 0 | OUTPATIENT
Start: 2017-08-07 | End: 2017-09-06

## 2017-08-07 RX ADMIN — Medication 4 UNIT(S): at 07:56

## 2017-08-07 RX ADMIN — HEPARIN SODIUM 5000 UNIT(S): 5000 INJECTION INTRAVENOUS; SUBCUTANEOUS at 05:44

## 2017-08-07 RX ADMIN — Medication 10: at 12:23

## 2017-08-07 RX ADMIN — SODIUM CHLORIDE 3 MILLILITER(S): 9 INJECTION INTRAMUSCULAR; INTRAVENOUS; SUBCUTANEOUS at 13:23

## 2017-08-07 RX ADMIN — LISINOPRIL 5 MILLIGRAM(S): 2.5 TABLET ORAL at 05:44

## 2017-08-07 RX ADMIN — Medication 4: at 07:56

## 2017-08-07 RX ADMIN — SODIUM CHLORIDE 3 MILLILITER(S): 9 INJECTION INTRAMUSCULAR; INTRAVENOUS; SUBCUTANEOUS at 05:45

## 2017-08-07 RX ADMIN — Medication 4 UNIT(S): at 12:23

## 2017-08-07 RX ADMIN — AMLODIPINE BESYLATE 5 MILLIGRAM(S): 2.5 TABLET ORAL at 05:44

## 2017-08-07 NOTE — DISCHARGE NOTE ADULT - MEDICATION SUMMARY - MEDICATIONS TO TAKE
I will START or STAY ON the medications listed below when I get home from the hospital:    BD Insulin Syringe (insulin syringe-needle u-100)   -- 1 each injectable 4 times a day  -- Indication: For dm    glucometer  -- 1 dose(s) injectable 3 times a day  -- Indication: For dm    glucose strips  -- 1 dose(s) injectable 4 times a day  -- Indication: For dm    alcohol swabs  -- 1 application injectable 4 times a day  -- Indication: For dm    blood glucose lancets  -- 1 unit(s) injectable 4 times a day  -- Indication: For dm    lisinopril 5 mg oral tablet  -- 1 tab(s) by mouth once a day  -- Indication: For Htn    insulin glargine (concentrated) 300 units/mL subcutaneous solution  -- 18 unit(s) subcutaneous once a day (at bedtime)  -- Check with your doctor before becoming pregnant.  Do not drink alcoholic beverages when taking this medication.  Expires___________________  It is very important that you take or use this exactly as directed.  Do not skip doses or discontinue unless directed by your doctor.  Keep in refrigerator.  Do not freeze.  Obtain medical advice before taking any non-prescription drugs as some may affect the action of this medication.  This drug may impair the ability to drive or operate machinery.  Use care until you become familiar with its effects.    -- Indication: For dm    insulin lispro (concentrated) 200 units/mL subcutaneous solution  -- 4 unit(s) subcutaneous 3 times a day (with meals)  -- Do not drink alcoholic beverages when taking this medication.  It is very important that you take or use this exactly as directed.  Do not skip doses or discontinue unless directed by your doctor.  Keep in refrigerator.  Do not freeze.    -- Indication: For dm    atorvastatin 20 mg oral tablet  -- 1 tab(s) by mouth once a day (at bedtime)  -- Indication: For dm I will START or STAY ON the medications listed below when I get home from the hospital:    glucometer  -- 1 dose(s) injectable 3 times a day  -- Indication: For dm    glucose strips  -- 1 dose(s) injectable 4 times a day  -- Indication: For dm    blood glucose lancets  -- 1 unit(s) injectable 4 times a day  -- Indication: For dm    alcohol swabs  -- 1 application injectable 4 times a day  -- Indication: For dm    BD Insulin Syringe (insulin syringe-needle u-100)   -- 1 each injectable 4 times a day  -- Indication: For dm    BD Insulin Syringe Safety-Corazon (insulin syringe-needle u-100)   -- 1 each injectable 4 times a day  -- Indication: For dm    lancets  -- 1 injectable 4 times a day  -- Indication: For dm    lisinopril 5 mg oral tablet  -- 1 tab(s) by mouth once a day  -- Indication: For dm    insulin glargine (concentrated) 300 units/mL subcutaneous solution  -- 18 unit(s) subcutaneous once a day (at bedtime)  -- Check with your doctor before becoming pregnant.  Do not drink alcoholic beverages when taking this medication.  Expires___________________  It is very important that you take or use this exactly as directed.  Do not skip doses or discontinue unless directed by your doctor.  Keep in refrigerator.  Do not freeze.  Obtain medical advice before taking any non-prescription drugs as some may affect the action of this medication.  This drug may impair the ability to drive or operate machinery.  Use care until you become familiar with its effects.    -- Indication: For dm    insulin lispro (concentrated) 200 units/mL subcutaneous solution  -- 4 unit(s) subcutaneous 3 times a day (with meals)  -- Do not drink alcoholic beverages when taking this medication.  It is very important that you take or use this exactly as directed.  Do not skip doses or discontinue unless directed by your doctor.  Keep in refrigerator.  Do not freeze.    -- Indication: For dm    atorvastatin 20 mg oral tablet  -- 1 tab(s) by mouth once a day (at bedtime)  -- Indication: For dm

## 2017-08-07 NOTE — DISCHARGE NOTE ADULT - SECONDARY DIAGNOSIS.
Elevated blood pressure reading LOULOU (acute kidney injury) Lung mass Tibial artery occlusion, left

## 2017-08-07 NOTE — DISCHARGE NOTE ADULT - CARE PLAN
Principal Discharge DX:	Hyperglycemia  Goal:	start insulin, f/u w pcp, dm diet  Instructions for follow-up, activity and diet:	start insulin, f/u w pcp, dm diet  Secondary Diagnosis:	Elevated blood pressure reading  Secondary Diagnosis:	LUOLOU (acute kidney injury)  Secondary Diagnosis:	Lung mass  Goal:	F/u results of biopsy pcp f/u  Secondary Diagnosis:	Tibial artery occlusion, left Principal Discharge DX:	Hyperglycemia  Goal:	start insulin, f/u w pcp, dm diet  Instructions for follow-up, activity and diet:	start insulin, f/u w pcp, dm diet  Secondary Diagnosis:	Elevated blood pressure reading  Secondary Diagnosis:	LOULOU (acute kidney injury)  Secondary Diagnosis:	Lung mass  Goal:	F/u results of biopsy pcp f/u  Secondary Diagnosis:	Tibial artery occlusion, left

## 2017-08-07 NOTE — DISCHARGE NOTE ADULT - CARE PROVIDERS DIRECT ADDRESSES
[email or web address]

## 2017-08-07 NOTE — DISCHARGE NOTE ADULT - VISION (WITH CORRECTIVE LENSES IF THE PATIENT USUALLY WEARS THEM):
Normal vision: sees adequately in most situations; can see medication labels, newsprint/use reading glasses

## 2017-08-07 NOTE — DISCHARGE NOTE ADULT - CARE PROVIDER_API CALL
Hao Scott), Internal Medicine  [address]  [address]  [place] [address]  Phone: [phone]  Fax: [phone]    Leo Shen), Surgery  [address]  [address]  [place] [address]  Phone: [phone]  Fax: [phone]

## 2017-08-07 NOTE — DISCHARGE NOTE ADULT - HOSPITAL COURSE
66 yo m no reported pmhx, presenting with diminished appetite since last weeks. However he states he saw a doctor in [place] approx 3 months ago at which time was instructed to follow up. prior to that doctor visit in [place] patient has not seen a physician in a number of years.  He was visiting his elderly mom who had not been feeling well and attributed  his decreased appetite to this event. Nevertheless he has since returned to [facility] one month ago and appetite continues to deteriorate. He only drinks water and juices and expressed frequent urination. Also c/o weight loss but unable to quantify.  Glucose 615 on presentation, also found to have L lung mass.     Problem/Plan - 1:  ·  Problem: Hyperglycemia.  Plan: new onset DM. not in DKA as AG at 17 per [facility] labs.   a1c elevated, c/w insulin at home, will have pcp apt        Problem/Plan - 2:  ·  Problem: LOULOU (acute kidney injury). resolved    Problem/Plan - 3:  ·  Problem: Lung mass.  Plan: Left upper lobe/suprahilar central lung mass (3.6 x 3.4 x 3.7 cm) with   nodular endobronchial component (1 cm) noted within the left upper lobe   bronchus.Additional nodules within the right lower lobe measuring 2 mm and 5 mm.    denies cough or smoking h/o or night sweat. admit to weight loss and decreased appetite    Pulmonary eval appreciated, Bronchoscopy done  f/u as outpt for results    Problem/Plan - 4:  ·  Problem: Tibial artery occlusion, left.  Plan: Arterial u/s shows occlusion, started on asa,   discussed plan with vascular, patient can f/u as outpatient.         Problem/Plan - 5:  Problem: Elevated blood pressure reading. Plan: c/w ace-i. Blood pressure is improving. c/w Zestril 5 mg/day. 68 yo m no reported pmhx, presenting with diminished appetite since last weeks. However he states he saw a doctor in [place] approx 3 months ago at which time was instructed to follow up. prior to that doctor visit in [place] patient has not seen a physician in a number of years.  He was visiting his elderly mom who had not been feeling well and attributed  his decreased appetite to this event. Nevertheless he has since returned to [facility] one month ago and appetite continues to deteriorate. He only drinks water and juices and expressed frequent urination. Also c/o weight loss but unable to quantify.  Glucose 615 on presentation, also found to have L lung mass.     Problem/Plan - 1:  ·  Problem: Hyperglycemia.  Plan: new onset DM. [facility]   a1c elevated, c/w insulin at home, will have pcp apt        Problem/Plan - 2:  ·  Problem: LOULOU (acute kidney injury). resolved    Problem/Plan - 3:  ·  Problem: Lung mass.  Plan: Left upper lobe/suprahilar central lung mass (3.6 x 3.4 x 3.7 cm) with   nodular endobronchial component (1 cm) noted within the left upper lobe   bronchus.Additional nodules within the right lower lobe measuring 2 mm and 5 mm.    denies cough or smoking h/o or night sweat. admit to weight loss and decreased appetite    Pulmonary eval appreciated, Bronchoscopy done  f/u as outpt for results    Problem/Plan - 4:  ·  Problem: Tibial artery occlusion, left.  Plan: Arterial u/s shows occlusion, started on asa,   discussed plan with vascular, patient can f/u as outpatient.         Problem/Plan - 5:  Problem: Elevated blood pressure reading. Plan: c/w ace-i. Blood pressure is improving. c/w Zestril 5 mg/day.        45  minutes spent on total discharge encounter; more than 50% of the visit was spent counseling and/or coordinating care by the attending physician. 68 yo m no reported pmhx, presenting with diminished appetite since last weeks. However he states he saw a doctor in [place] approx 3 months ago at which time was instructed to follow up. prior to that doctor visit in [place] patient has not seen a physician in a number of years.  He was visiting his elderly mom who had not been feeling well and attributed  his decreased appetite to this event. Nevertheless he has since returned to [facility] one month ago and appetite continues to deteriorate. He only drinks water and juices and expressed frequent urination. Also c/o weight loss but unable to quantify.  Glucose 615 on presentation, also found to have L lung mass.     Problem/Plan - 1:  ·  Problem: Hyperglycemia.  Plan: new onset DM. [facility].   a1c elevated, c/w insulin at home, will have pcp apt        Problem/Plan - 2:  ·  Problem: LOULOU (acute kidney injury). resolved    Problem/Plan - 3:  ·  Problem: Lung mass.  Plan: Left upper lobe/suprahilar central lung mass (3.6 x 3.4 x 3.7 cm) with   nodular endobronchial component (1 cm) noted within the left upper lobe   bronchus.Additional nodules within the right lower lobe measuring 2 mm and 5 mm.    denies cough or smoking h/o or night sweat. admit to weight loss and decreased appetite    Pulmonary eval appreciated, Bronchoscopy done  f/u as outpt for results    Problem/Plan - 4:  ·  Problem: Tibial artery occlusion, left.  Plan: Arterial u/s shows occlusion, started on asa,   discussed plan with vascular, patient can f/u as outpatient.         Seen by nutrition for severe protein calorie malnutrition suggested glucerna shake twice a day    Problem/Plan - 5:  Problem: Elevated blood pressure reading. Plan: c/w ace-i. Blood pressure is improving. c/w Zestril 5 mg/day.        45  minutes spent on total discharge encounter; more than 50% of the visit was spent counseling and/or coordinating care by the attending physician.

## 2017-08-07 NOTE — DISCHARGE NOTE ADULT - PATIENT PORTAL LINK FT
“You can access the [facility] Patient Portal, offered by [facility], by registering with the following website: [email or web address]”

## 2017-08-08 LAB
CULTURE RESULTS: SIGNIFICANT CHANGE UP
GRAM STN FLD: SIGNIFICANT CHANGE UP
ORGANISM # SPEC MICROSCOPIC CNT: SIGNIFICANT CHANGE UP
ORGANISM # SPEC MICROSCOPIC CNT: SIGNIFICANT CHANGE UP
SPECIMEN SOURCE: SIGNIFICANT CHANGE UP

## 2017-08-08 RX ORDER — INSULIN LISPRO 100/ML
4 VIAL (ML) SUBCUTANEOUS
Qty: 1 | Refills: 0 | OUTPATIENT
Start: 2017-08-08 | End: 2017-09-07

## 2017-08-08 RX ORDER — ATORVASTATIN CALCIUM 80 MG/1
1 TABLET, FILM COATED ORAL
Qty: 30 | Refills: 0 | OUTPATIENT
Start: 2017-08-08 | End: 2017-09-07

## 2017-08-08 RX ORDER — LISINOPRIL 2.5 MG/1
1 TABLET ORAL
Qty: 30 | Refills: 0 | OUTPATIENT
Start: 2017-08-08 | End: 2017-09-07

## 2017-08-08 RX ORDER — INSULIN GLARGINE 100 [IU]/ML
18 INJECTION, SOLUTION SUBCUTANEOUS
Qty: 1 | Refills: 0 | OUTPATIENT
Start: 2017-08-08 | End: 2017-09-07

## 2017-08-08 RX ORDER — ISOPROPYL ALCOHOL, BENZOCAINE .7; .06 ML/ML; ML/ML
1 SWAB TOPICAL
Qty: 1 | Refills: 0 | OUTPATIENT
Start: 2017-08-08 | End: 2017-09-07

## 2017-08-09 LAB
NON-GYNECOLOGICAL CYTOLOGY STUDY: SIGNIFICANT CHANGE UP
NON-GYNECOLOGICAL CYTOLOGY STUDY: SIGNIFICANT CHANGE UP

## 2017-08-11 LAB — SURGICAL PATHOLOGY FINAL REPORT - CH: SIGNIFICANT CHANGE UP

## 2017-08-14 ENCOUNTER — APPOINTMENT (OUTPATIENT)
Dept: INTERNAL MEDICINE | Facility: CLINIC | Age: 67
End: 2017-08-14

## 2017-08-18 ENCOUNTER — APPOINTMENT (OUTPATIENT)
Dept: INTERNAL MEDICINE | Facility: CLINIC | Age: 67
End: 2017-08-18
Payer: SELF-PAY

## 2017-08-18 VITALS — WEIGHT: 190 LBS | BODY MASS INDEX: 28.14 KG/M2 | HEIGHT: 69 IN

## 2017-08-18 VITALS
OXYGEN SATURATION: 98 % | SYSTOLIC BLOOD PRESSURE: 140 MMHG | DIASTOLIC BLOOD PRESSURE: 78 MMHG | RESPIRATION RATE: 16 BRPM | HEART RATE: 92 BPM

## 2017-08-18 DIAGNOSIS — Z78.9 OTHER SPECIFIED HEALTH STATUS: ICD-10-CM

## 2017-08-18 PROCEDURE — 99205 OFFICE O/P NEW HI 60 MIN: CPT

## 2017-08-18 RX ORDER — LISINOPRIL 5 MG/1
5 TABLET ORAL
Refills: 0 | Status: ACTIVE | COMMUNITY

## 2017-08-18 RX ORDER — ASPIRIN ENTERIC COATED TABLETS 81 MG 81 MG/1
81 TABLET, DELAYED RELEASE ORAL
Refills: 0 | Status: ACTIVE | COMMUNITY

## 2017-08-18 RX ORDER — ATORVASTATIN CALCIUM 20 MG/1
20 TABLET, FILM COATED ORAL
Refills: 0 | Status: ACTIVE | COMMUNITY

## 2017-08-18 RX ORDER — INSULIN LISPRO 100 [IU]/ML
100 INJECTION, SOLUTION INTRAVENOUS; SUBCUTANEOUS
Qty: 1 | Refills: 1 | Status: ACTIVE | COMMUNITY

## 2017-08-25 ENCOUNTER — APPOINTMENT (OUTPATIENT)
Dept: INTERNAL MEDICINE | Facility: CLINIC | Age: 67
End: 2017-08-25
Payer: SELF-PAY

## 2017-08-25 VITALS — WEIGHT: 188 LBS | BODY MASS INDEX: 27.85 KG/M2 | HEIGHT: 69 IN

## 2017-08-25 VITALS — DIASTOLIC BLOOD PRESSURE: 66 MMHG | RESPIRATION RATE: 16 BRPM | HEART RATE: 88 BPM | SYSTOLIC BLOOD PRESSURE: 136 MMHG

## 2017-08-25 VITALS — DIASTOLIC BLOOD PRESSURE: 64 MMHG | SYSTOLIC BLOOD PRESSURE: 130 MMHG

## 2017-08-25 DIAGNOSIS — C7A.8 OTHER MALIGNANT NEUROENDOCRINE TUMORS: ICD-10-CM

## 2017-08-25 DIAGNOSIS — D3A.090 BENIGN CARCINOID TUMOR OF THE BRONCHUS AND LUNG: ICD-10-CM

## 2017-08-25 DIAGNOSIS — I10 ESSENTIAL (PRIMARY) HYPERTENSION: ICD-10-CM

## 2017-08-25 DIAGNOSIS — I74.3 EMBOLISM AND THROMBOSIS OF ARTERIES OF THE LOWER EXTREMITIES: ICD-10-CM

## 2017-08-25 DIAGNOSIS — E11.65 TYPE 2 DIABETES MELLITUS WITH HYPERGLYCEMIA: ICD-10-CM

## 2017-08-25 DIAGNOSIS — E78.5 HYPERLIPIDEMIA, UNSPECIFIED: ICD-10-CM

## 2017-08-25 PROCEDURE — 99214 OFFICE O/P EST MOD 30 MIN: CPT

## 2017-08-30 LAB
CULTURE RESULTS: SIGNIFICANT CHANGE UP
SPECIMEN SOURCE: SIGNIFICANT CHANGE UP

## 2017-09-07 ENCOUNTER — APPOINTMENT (OUTPATIENT)
Dept: THORACIC SURGERY | Facility: CLINIC | Age: 67
End: 2017-09-07
Payer: SELF-PAY

## 2017-09-07 VITALS
DIASTOLIC BLOOD PRESSURE: 68 MMHG | RESPIRATION RATE: 16 BRPM | HEART RATE: 82 BPM | SYSTOLIC BLOOD PRESSURE: 140 MMHG | HEIGHT: 69 IN | WEIGHT: 188 LBS | BODY MASS INDEX: 27.85 KG/M2 | OXYGEN SATURATION: 98 %

## 2017-09-07 DIAGNOSIS — Z86.79 PERSONAL HISTORY OF OTHER DISEASES OF THE CIRCULATORY SYSTEM: ICD-10-CM

## 2017-09-07 DIAGNOSIS — C7A.8 OTHER MALIGNANT NEUROENDOCRINE TUMORS: ICD-10-CM

## 2017-09-07 DIAGNOSIS — Z86.39 PERSONAL HISTORY OF OTHER ENDOCRINE, NUTRITIONAL AND METABOLIC DISEASE: ICD-10-CM

## 2017-09-07 PROCEDURE — 99205 OFFICE O/P NEW HI 60 MIN: CPT

## 2017-09-19 NOTE — CHART NOTE - NSCHARTNOTEFT_GEN_A_CORE
per arterial duplex there is an occlusion of left tibial artery pt will need vascular consult in morning .   will start  aspirin..
s/p bronchoscopy 8/4/17 for left upper lobe lung mass with endobronchial extention. 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
Upon Nutritional Assessment by the Registered Dietitian your patient was determined to meet criteria / has evidence of the following diagnosis/diagnoses:          [ ]  Mild Protein Calorie Malnutrition        [ ]  Moderate Protein Calorie Malnutrition        [ ] Severe Protein Calorie Malnutrition        [ ] Unspecified Protein Calorie Malnutrition        [ ] Underweight / BMI <19        [ ] Morbid Obesity / BMI > 40      Findings as based on:  •  Comprehensive nutrition assessment and consultation  •  Calorie counts (nutrient intake analysis)  •  Food acceptance and intake status from observations by staff  •  Follow up  •  Patient education  •  Intervention secondary to interdisciplinary rounds  •   concerns      Treatment:    The following diet has been recommended:  Dash/TLC/CCHO no snack/Glucerna shake 1 can daily(220kcal & 10gm protein)     PROVIDER Section:     By signing this assessment you are acknowledging and agree with the diagnosis/diagnoses assigned by the Registered Dietitian    Comments:

## 2017-09-23 LAB
CULTURE RESULTS: SIGNIFICANT CHANGE UP
NIGHT BLUE STAIN TISS: SIGNIFICANT CHANGE UP
SPECIMEN SOURCE: SIGNIFICANT CHANGE UP

## 2017-09-28 ENCOUNTER — RX RENEWAL (OUTPATIENT)
Age: 67
End: 2017-09-28

## 2017-09-28 RX ORDER — INSULIN GLARGINE 100 [IU]/ML
100 INJECTION, SOLUTION SUBCUTANEOUS
Qty: 1 | Refills: 3 | Status: ACTIVE | COMMUNITY
Start: 1900-01-01 | End: 1900-01-01

## 2017-10-23 ENCOUNTER — APPOINTMENT (OUTPATIENT)
Dept: INTERNAL MEDICINE | Facility: CLINIC | Age: 67
End: 2017-10-23

## 2020-11-30 NOTE — PATIENT PROFILE ADULT. - TYPE OF ADMISSION, PATIENT PROFILE
Luis Manuel Cross called to request appointment with Dr cyrus Shah. If approved, please call her to schedule. Thank you. Shaggy Allan with [phone] [facility] called to schedule a NP appt with Dr Radha Reyes. Emergent/ED

## 2021-06-07 NOTE — H&P ADULT - NSHPPHYSICALEXAM_GEN_ALL_CORE
LVM for patient to call back and schedule with PCP as patient is experiencing full body hives.     GENERAL: NAD, well-groomed, well-developed  HEAD:  Atraumatic, Normocephalic  EYES: EOMI, PERRLA, conjunctiva and sclera clear  ENMT: No tonsillar erythema, exudates, or enlargement; Moist mucous membranes, Good dentition, No lesions  NECK: Supple, No JVD, Normal thyroid  NERVOUS SYSTEM:  Alert & Oriented X3, Good concentration; Motor Strength 5/5 B/L upper and lower extremities; DTRs 2+ intact and symmetric  CHEST/LUNG: Clear to percussion bilaterally; No rales, rhonchi, wheezing, or rubs  HEART: Regular rate and rhythm; No murmurs, rubs, or gallops  ABDOMEN: Soft, Nontender, Nondistended; Bowel sounds present  EXTREMITIES:  1+ Peripheral Pulses dorsalis pedis with  slightly decreased on left as compared to right , No clubbing, cyanosis, or edema  LYMPH: No lymphadenopathy noted  SKIN: No rashes or lesions Vital Signs Last 24 Hrs  T(C): 36.9 (31 Jul 2017 12:15), Max: 36.9 (31 Jul 2017 05:29)  T(F): 98.5 (31 Jul 2017 12:15), Max: 98.5 (31 Jul 2017 12:15)  HR: 103 (31 Jul 2017 12:15) (103 - 124)  BP: 156/98 (31 Jul 2017 12:15) (136/101 - 156/105)  BP(mean): --  RR: 15 (31 Jul 2017 12:15) (15 - 20)  SpO2: 97% (31 Jul 2017 12:15) (95% - 99%)    GENERAL: NAD, well-groomed, well-developed  HEAD:  Atraumatic, Normocephalic  EYES: EOMI, PERRLA, conjunctiva and sclera clear  ENMT: No tonsillar erythema, exudates, or enlargement; Moist mucous membranes, Good dentition, No lesions  NECK: Supple, No JVD, Normal thyroid  NERVOUS SYSTEM:  Alert & Oriented X3, Good concentration; Motor Strength 5/5 B/L upper and lower extremities; DTRs 2+ intact and symmetric  CHEST/LUNG: Clear to percussion bilaterally; No rales, rhonchi, wheezing, or rubs  HEART: Regular rate and rhythm; No murmurs, rubs, or gallops  ABDOMEN: Soft, Nontender, Nondistended; Bowel sounds present  EXTREMITIES:  1+ Peripheral Pulses dorsalis pedis with  slightly decreased on left as compared to right , No clubbing, cyanosis, or edema  LYMPH: No lymphadenopathy noted  SKIN: No rashes or lesions

## 2023-01-03 NOTE — PROGRESS NOTE ADULT - PROBLEM SELECTOR PLAN 8
clinically no abd pain .    monitor pt clinically, trending down
has improved  will monitor labs off of fluids
improving   will monitor bmp, d/c fluids
No indicators present